# Patient Record
Sex: FEMALE | Race: BLACK OR AFRICAN AMERICAN | Employment: PART TIME | URBAN - METROPOLITAN AREA
[De-identification: names, ages, dates, MRNs, and addresses within clinical notes are randomized per-mention and may not be internally consistent; named-entity substitution may affect disease eponyms.]

---

## 2017-08-21 ENCOUNTER — OFFICE VISIT (OUTPATIENT)
Dept: FAMILY MEDICINE CLINIC | Age: 32
End: 2017-08-21

## 2017-08-21 DIAGNOSIS — N94.6 SEVERE MENSTRUAL CRAMPS: ICD-10-CM

## 2017-08-21 DIAGNOSIS — N92.1 MENORRHAGIA WITH IRREGULAR CYCLE: ICD-10-CM

## 2017-08-21 DIAGNOSIS — G43.009 MIGRAINE WITHOUT AURA AND WITHOUT STATUS MIGRAINOSUS, NOT INTRACTABLE: ICD-10-CM

## 2017-08-21 DIAGNOSIS — Z12.4 SCREENING FOR CERVICAL CANCER: ICD-10-CM

## 2017-08-21 DIAGNOSIS — Z13.220 SCREENING FOR CHOLESTEROL LEVEL: ICD-10-CM

## 2017-08-21 DIAGNOSIS — E66.01 MORBID OBESITY, UNSPECIFIED OBESITY TYPE (HCC): Primary | ICD-10-CM

## 2017-08-21 DIAGNOSIS — J45.20 MILD INTERMITTENT ASTHMA WITHOUT COMPLICATION: ICD-10-CM

## 2017-08-21 RX ORDER — PHENTERMINE HYDROCHLORIDE 37.5 MG/1
37.5 TABLET ORAL
Qty: 30 TAB | Refills: 3 | Status: SHIPPED | OUTPATIENT
Start: 2017-08-21 | End: 2018-02-27

## 2017-08-21 NOTE — PATIENT INSTRUCTIONS

## 2017-08-21 NOTE — MR AVS SNAPSHOT
Visit Information Date & Time Provider Department Dept. Phone Encounter #  
 8/21/2017 11:30 AM Dipika Rodríguez MD [facility] [id number] Follow-up Instructions Return in about 3 months (around 11/21/2017) for follow up. Upcoming Health Maintenance Date Due Pneumococcal 19-64 Medium Risk (1 of 1 - PPSV23) 9/10/2006 PAP AKA CERVICAL CYTOLOGY 5/13/2017 INFLUENZA AGE 9 TO ADULT 8/1/2017 DTaP/Tdap/Td series (2 - Td) 10/10/2019 Allergies as of 8/21/2017  Review Complete On: 8/21/2017 By: Dipika Rodríguez MD  
  
 Severity Noted Reaction Type Reactions Bactrim [Sulfamethoprim Ds] High 10/20/2015    Other (comments) Blisters-Abdirahman Gino's Iodine  08/10/2015   Side Effect Swelling, Hives Sulfamethoxazole-trimethoprim  09/14/2016    Unknown (comments) Pt states inside of her mouth feels raw when she takes Bactrim. Current Immunizations  Reviewed on 8/21/2017 Name Date Influenza Vaccine 11/11/2013 Influenza Vaccine Split 10/13/2011 Tdap 10/10/2009 Reviewed by Pop Gomez LPN on 3/16/6593 at 20:23 AM  
You Were Diagnosed With   
  
 Codes Comments Morbid obesity, unspecified obesity type ([facility]    -  Primary ICD-10-CM: E66.01 
ICD-9-CM: 278.01 Migraine without aura and without status migrainosus, not intractable     ICD-10-CM: Q00.546 ICD-9-CM: 346.10 Mild intermittent asthma without complication     [facility]-RN: J45.20 ICD-9-CM: 493.90 Screening for cervical cancer     ICD-10-CM: Z12.4 ICD-9-CM: V76.2 Screening for cholesterol level     ICD-10-CM: Z13.220 ICD-9-CM: V77.91 Menorrhagia with irregular cycle     ICD-10-CM: N92.1 ICD-9-CM: 626.2 Severe menstrual cramps     ICD-10-CM: N94.6 ICD-9-CM: 803. 3 Vitals BP Pulse Temp Resp Height(growth percentile) Weight(growth percentile)  143/89 78 97.2 °F (36.2 °C) (Oral) 17 5' 8\" (1.727 m) (!) 442 lb (200.5 kg) LMP BMI OB Status Smoking Status 07/16/2017 67.21 kg/m2 Having regular periods Never Smoker Vitals History BMI and BSA Data Body Mass Index Body Surface Area  
 67.21 kg/m 2 3.1 m 2 Preferred Pharmacy Pharmacy Name Phone [facility] [address], [place] [id number] [facility] [phone] Your Updated Medication List  
  
   
This list is accurate as of: 8/21/17 12:17 PM.  Always use your most recent med list.  
  
  
  
  
 HYDROcodone-acetaminophen 5-325 mg per tablet Commonly known as:  Fidel Kimmie Take 1 Tab by mouth every six (6) hours as needed for Pain. Max Daily Amount: 4 Tabs. ibuprofen 800 mg tablet Commonly known as:  MOTRIN Take 1 Tab by mouth every six (6) hours as needed for Pain.  
  
 phentermine 37.5 mg tablet Commonly known as:  ADIPEX-P Take 1 Tab by mouth every morning. Max Daily Amount: 37.5 mg. PROAIR HFA 90 mcg/actuation inhaler Generic drug:  albuterol Take 2 Puffs by inhalation every six (6) hours as needed for Wheezing. topiramate 50 mg tablet Commonly known as:  TOPAMAX Take 1 Tab by mouth two (2) times a day. Prescriptions Printed Refills  
 phentermine (ADIPEX-P) 37.5 mg tablet 3 Sig: Take 1 Tab by mouth every morning. Max Daily Amount: 37.5 mg.  
 Class: Print Route: Oral  
  
Follow-up Instructions Return in about 3 months (around 11/21/2017) for follow up. To-Do List   
 08/21/2017 Lab:  CBC WITH AUTOMATED DIFF   
  
 08/21/2017 Lab:  LIPID PANEL   
  
 08/21/2017 Lab:  METABOLIC PANEL, COMPREHENSIVE   
  
 08/21/2017 Pathology:  PAP, LB, RFX HPV [id number])   
  
 08/21/2017 Lab:  TSH 3RD GENERATION   
  
 09/21/2017 Imaging:  US PELV NON OBS  LTD Patient Instructions Well Visit, Ages 25 to 48: Care Instructions Your Care Instructions Physical exams can help you stay healthy.  Your doctor has checked your overall health and may have suggested ways to take good care of yourself. He or she also may have recommended tests. At home, you can help prevent illness with healthy eating, regular exercise, and other steps. Follow-up care is a key part of your treatment and safety. Be sure to make and go to all appointments, and call your doctor if you are having problems. It's also a good idea to know your test results and keep a list of the medicines you take. How can you care for yourself at home? · Reach and stay at a healthy weight. This will lower your risk for many problems, such as obesity, diabetes, heart disease, and high blood pressure. · Get at least 30 minutes of physical activity on most days of the week. Walking is a good choice. You also may want to do other activities, such as running, swimming, cycling, or playing tennis or team sports. Discuss any changes in your exercise program with your doctor. · Do not smoke or allow others to smoke around you. If you need help quitting, talk to your doctor about stop-smoking programs and medicines. These can increase your chances of quitting for good. · Talk to your doctor about whether you have any risk factors for sexually transmitted infections (STIs). Having one sex partner (who does not have STIs and does not have sex with anyone else) is a good way to avoid these infections. · Use birth control if you do not want to have children at this time. Talk with your doctor about the choices available and what might be best for you. · Protect your skin from too much sun. When you're outdoors from 10 a.m. to 4 p.m., stay in the shade or cover up with clothing and a hat with a wide brim. Wear sunglasses that block UV rays. Even when it's cloudy, put broad-spectrum sunscreen (SPF 30 or higher) on any exposed skin. · See a dentist one or two times a year for checkups and to have your teeth cleaned. · Wear a seat belt in the car. · Drink alcohol in moderation, if at all. That means no more than 2 drinks a day for men and 1 drink a day for women. Follow your doctor's advice about when to have certain tests. These tests can spot problems early. For everyone · Cholesterol. Have the fat (cholesterol) in your blood tested after age 21. Your doctor will tell you how often to have this done based on your age, family history, or other things that can increase your risk for heart disease. · Blood pressure. Have your blood pressure checked during a routine doctor visit. Your doctor will tell you how often to check your blood pressure based on your age, your blood pressure results, and other factors. · Vision. Talk with your doctor about how often to have a glaucoma test. 
· Diabetes. Ask your doctor whether you should have tests for diabetes. · Colon cancer. Have a test for colon cancer at age 48. You may have one of several tests. If you are younger than 48, you may need a test earlier if you have any risk factors. Risk factors include whether you already had a precancerous polyp removed from your colon or whether your parent, brother, sister, or child has had colon cancer. For women · Breast exam and mammogram. Talk to your doctor about when you should have a clinical breast exam and a mammogram. Medical experts differ on whether and how often women under 50 should have these tests. Your doctor can help you decide what is right for you. · Pap test and pelvic exam. Begin Pap tests at age 24. A Pap test is the best way to find cervical cancer. The test often is part of a pelvic exam. Ask how often to have this test. 
· Tests for sexually transmitted infections (STIs). Ask whether you should have tests for STIs. You may be at risk if you have sex with more than one person, especially if your partners do not wear condoms. For men · Tests for sexually transmitted infections (STIs).  Ask whether you should have tests for STIs. You may be at risk if you have sex with more than one person, especially if you do not wear a condom. · Testicular cancer exam. Ask your doctor whether you should check your testicles regularly. · Prostate exam. Talk to your doctor about whether you should have a blood test (called a PSA test) for prostate cancer. Experts differ on whether and when men should have this test. Some experts suggest it if you are older than 39 and are -American or have a father or brother who got prostate cancer when he was younger than 72. When should you call for help? Watch closely for changes in your health, and be sure to contact your doctor if you have any problems or symptoms that concern you. Where can you learn more? Go to http://doug-luna.info/. Enter P072 in the search box to learn more about \"Well Visit, Ages 25 to 48: Care Instructions. \" Current as of: July 19, 2016 Content Version: 11.3 © 6387-0707 [employer]. Care instructions adapted under license by [employer] (which disclaims liability or warranty for this information). If you have questions about a medical condition or this instruction, always ask your healthcare professional. [place] [address] any warranty or liability for your use of this information. Introducing [facility] [place] [facility]! Dear Elyse Green: 
Thank you for requesting a [employer] account. Our records indicate that you already have an active [employer] account. You can access your account anytime at [email or web address]. [email or web address] Did you know that you can access your hospital and ER discharge instructions at any time in [employer]? You can also review all of your test results from your hospital stay or ER visit. Additional Information If you have questions, please visit the Frequently Asked Questions section of the [employer] website at [email or web address] Remember, [employer] is NOT to be used for urgent needs. For medical emergencies, dial 911. Now available from your iPhone and Android! Please provide this summary of care documentation to your next provider. Your primary care clinician is listed as [employer]. If you have any questions after today's visit, please call [phone].

## 2017-08-21 NOTE — PROGRESS NOTES
Chief Complaint   Patient presents with    Physical     *Pap    Other     Heavy Menstration       Pt is a 36y.o. year old female who presents for follow up of her chronic medical problems/PAP smear    Health Maintenance Due   Topic Date Due    Pneumococcal 19-64 Medium Risk (1 of 1 - PPSV23) 09/10/2006    PAP AKA CERVICAL CYTOLOGY  05/13/2017    INFLUENZA AGE 9 TO ADULT  08/01/2017       Wt Readings from Last 3 Encounters:   08/21/17 (!) 442 lb (200.5 kg)   02/13/17 (!) 409 lb (185.5 kg)   09/14/16 (!) 443 lb (200.9 kg)   Admits she has not been compliant with diet and exercise  Afraid of having bariatric surgery  Has never tried weight loss meds but interested in doing so    Lab Results   Component Value Date/Time    Cholesterol, total 116 09/14/2016 11:38 AM    HDL Cholesterol 35 09/14/2016 11:38 AM    LDL, calculated 62 09/14/2016 11:38 AM    VLDL, calculated 19 09/14/2016 11:38 AM    Triglyceride 96 09/14/2016 11:38 AM   Fasting today    Asthma-no recent attacks    Migraines-rare    New problem: severe menstrual cramps and heavy menses recently      ROS:    Pt denies: Wt loss, Fever/Chills, HA, Visual changes, Fatigue, Chest pain, SOB, COYLE, Abd pain, N/V/D/C, Blood in stool or urine, Edema. Pertinent positive as above in HPI. All others were negative    Patient Active Problem List   Diagnosis Code    Morbid obesity ([facility] E66.01    Migraine without aura and without status migrainosus, not intractable G43.009    Asthma J45.909       Past Medical History:   Diagnosis Date    Abscess and cellulitis     recurrent since age 17    Abscess and cellulitis     Asthma     since she was a child    Headache, common migraine        Current Outpatient Prescriptions   Medication Sig Dispense Refill           ibuprofen (MOTRIN) 800 mg tablet Take 1 Tab by mouth every six (6) hours as needed for Pain.  20 Tab 0    PROAIR HFA 90 mcg/actuation inhaler Take 2 Puffs by inhalation every six (6) hours as needed for Wheezing. 1 Inhaler 3    topiramate (TOPAMAX) 50 mg tablet Take 1 Tab by mouth two (2) times a day. 61 Tab 6       History   Smoking Status    Never Smoker   Smokeless Tobacco    Never Used       Allergies   Allergen Reactions    Bactrim [Sulfamethoprim Ds] Other (comments)     Blisters-Abdirahman Christian's    Iodine Swelling and Hives    Sulfamethoxazole-Trimethoprim Unknown (comments)     Pt states inside of her mouth feels raw when she takes Bactrim. Patient Labs were reviewed: yes      Patient Past Records were reviewed:  yes        Objective:     Vitals:    08/21/17 1053 08/21/17 1130   BP: 143/89 136/72   Pulse: 78    Resp: 17    Temp: 97.2 °F (36.2 °C)    TempSrc: Oral    Weight: (!) 442 lb (200.5 kg)    Height: 5' 8\" (1.727 m)      Body mass index is 67.21 kg/(m^2). Exam:   Appearance: alert, well appearing,  oriented to person, place, and time, acyanotic, in no respiratory distress and well hydrated. HEENT:  NC/AT, pink conj, anicteric sclerae  Neck:  No cervical lymphadenopathy, no JVD, no thyromegaly, no carotid bruit  Heart:  RRR without M/R/G  Lungs:  CTAB, no rhonchi, rales, or wheezes with good air exchange   Abdomen:  Non-tender, pos bowel sounds, no hepatosplenomegaly  Ext:  No C/C/E    Skin: no rash  Neuro: no lateralizing signs, CNs II-XII intact  Breast exam: no palpable masses bilaterally, no nipple discharge, no axillary adenopathy, no skin changes  Pelvic Exam: NEG, cervix visualized without any abnormalities, no AMT; PAP done    Assessment/ Plan:   Diagnoses and all orders for this visit:    1. Morbid obesity, unspecified obesity type (HCC)-discussed calorie counting and regular exercise to lose weight; Phentermine for a few months-advised re possible side effects and that this is not a long term tx  -     TSH 3RD GENERATION; Future  -     LIPID PANEL; Future  -     METABOLIC PANEL, COMPREHENSIVE; Future  -     phentermine (ADIPEX-P) 37.5 mg tablet;  Take 1 Tab by mouth every morning. Max Daily Amount: 37.5 mg.     2. Migraine without aura and without status migrainosus, not intractable-doing well on Topamax prophylaxis    3. Mild intermittent asthma without complication-asymptomatic, rarely takes the Albuterol inhaler    4. Screening for cervical cancer  -     PAP, LB, RFX HPV DAY([id number]); Future    5. Screening for cholesterol level-lipid panel done today    6. Menorrhagia with irregular cycle-advised this could also be likely from the recent weight gain  -     CBC WITH AUTOMATED DIFF; Future  -     US PELV NON OBS  LTD; Future    7. Severe menstrual cramps-rule out fibroids  -     US PELV NON OBS  LTD; Future      Follow-up Disposition:  Return in about 3 months (around 11/21/2017) for follow up. I have discussed the diagnosis with the patient and the intended plan as seen in the above orders. The patient has received an After-Visit Summary and questions were answered concerning future plans. Medication Side Effects and Warnings were discussed with patient: yes    Patient verbalized understanding of above instructions.     Zach Malik MD  Internal Medicine  [facility]

## 2017-08-21 NOTE — PROGRESS NOTES
1. Have you been to the ER, urgent care clinic since your last visit? Hospitalized since your last visit? No    2. Have you seen or consulted any other health care providers outside of the [address] [place] [address] since your last visit? Include any pap smears or colon screening. No     Patient presents for physical. Patient is due for a pap.

## 2017-08-22 LAB
ALBUMIN SERPL-MCNC: 4 G/DL (ref 3.5–5.5)
ALBUMIN/GLOB SERPL: 1.1 {RATIO} (ref 1.2–2.2)
ALP SERPL-CCNC: 97 IU/L (ref 39–117)
ALT SERPL-CCNC: 15 IU/L (ref 0–32)
AST SERPL-CCNC: 12 IU/L (ref 0–40)
BASOPHILS # BLD AUTO: 0 X10E3/UL (ref 0–0.2)
BASOPHILS NFR BLD AUTO: 0 %
BILIRUB SERPL-MCNC: <0.2 MG/DL (ref 0–1.2)
BUN SERPL-MCNC: 8 MG/DL (ref 6–20)
BUN/CREAT SERPL: 10 (ref 9–23)
CALCIUM SERPL-MCNC: 9.1 MG/DL (ref 8.7–10.2)
CHLORIDE SERPL-SCNC: 100 MMOL/L (ref 96–106)
CHOLEST SERPL-MCNC: 134 MG/DL (ref 100–199)
CO2 SERPL-SCNC: 24 MMOL/L (ref 18–29)
CREAT SERPL-MCNC: 0.78 MG/DL (ref 0.57–1)
EOSINOPHIL # BLD AUTO: 0.2 X10E3/UL (ref 0–0.4)
EOSINOPHIL NFR BLD AUTO: 2 %
ERYTHROCYTE [DISTWIDTH] IN BLOOD BY AUTOMATED COUNT: 15.1 % (ref 12.3–15.4)
GLOBULIN SER CALC-MCNC: 3.6 G/DL (ref 1.5–4.5)
GLUCOSE SERPL-MCNC: 92 MG/DL (ref 65–99)
HCT VFR BLD AUTO: 36.4 % (ref 34–46.6)
HDLC SERPL-MCNC: 32 MG/DL
HGB BLD-MCNC: 11.4 G/DL (ref 11.1–15.9)
IMM GRANULOCYTES # BLD: 0 X10E3/UL (ref 0–0.1)
IMM GRANULOCYTES NFR BLD: 0 %
INTERPRETATION, 910389: NORMAL
LDLC SERPL CALC-MCNC: 84 MG/DL (ref 0–99)
LYMPHOCYTES # BLD AUTO: 3.2 X10E3/UL (ref 0.7–3.1)
LYMPHOCYTES NFR BLD AUTO: 26 %
MCH RBC QN AUTO: 25.8 PG (ref 26.6–33)
MCHC RBC AUTO-ENTMCNC: 31.3 G/DL (ref 31.5–35.7)
MCV RBC AUTO: 82 FL (ref 79–97)
MONOCYTES # BLD AUTO: 0.4 X10E3/UL (ref 0.1–0.9)
MONOCYTES NFR BLD AUTO: 4 %
NEUTROPHILS # BLD AUTO: 8.5 X10E3/UL (ref 1.4–7)
NEUTROPHILS NFR BLD AUTO: 68 %
PLATELET # BLD AUTO: 440 X10E3/UL (ref 150–379)
POTASSIUM SERPL-SCNC: 4.7 MMOL/L (ref 3.5–5.2)
PROT SERPL-MCNC: 7.6 G/DL (ref 6–8.5)
RBC # BLD AUTO: 4.42 X10E6/UL (ref 3.77–5.28)
SODIUM SERPL-SCNC: 141 MMOL/L (ref 134–144)
TRIGL SERPL-MCNC: 92 MG/DL (ref 0–149)
TSH SERPL DL<=0.005 MIU/L-ACNC: 2.61 UIU/ML (ref 0.45–4.5)
VLDLC SERPL CALC-MCNC: 18 MG/DL (ref 5–40)
WBC # BLD AUTO: 12.4 X10E3/UL (ref 3.4–10.8)

## 2017-08-23 LAB
CYTOLOGIST CVX/VAG CYTO: NORMAL
DX ICD CODE: NORMAL
LABCORP, 190119: NORMAL
Lab: NORMAL
OTHER STN SPEC: NORMAL
PATH REPORT.FINAL DX SPEC: NORMAL
STAT OF ADQ CVX/VAG CYTO-IMP: NORMAL

## 2017-08-26 VITALS
HEIGHT: 68 IN | WEIGHT: 293 LBS | TEMPERATURE: 97.2 F | DIASTOLIC BLOOD PRESSURE: 72 MMHG | HEART RATE: 78 BPM | BODY MASS INDEX: 44.41 KG/M2 | SYSTOLIC BLOOD PRESSURE: 136 MMHG | RESPIRATION RATE: 17 BRPM

## 2018-02-27 ENCOUNTER — OFFICE VISIT (OUTPATIENT)
Dept: FAMILY MEDICINE CLINIC | Age: 33
End: 2018-02-27

## 2018-02-27 VITALS
WEIGHT: 293 LBS | OXYGEN SATURATION: 99 % | SYSTOLIC BLOOD PRESSURE: 147 MMHG | HEIGHT: 68 IN | HEART RATE: 81 BPM | TEMPERATURE: 97.5 F | RESPIRATION RATE: 20 BRPM | BODY MASS INDEX: 44.41 KG/M2 | DIASTOLIC BLOOD PRESSURE: 83 MMHG

## 2018-02-27 DIAGNOSIS — R03.0 ELEVATED BP WITHOUT DIAGNOSIS OF HYPERTENSION: ICD-10-CM

## 2018-02-27 DIAGNOSIS — G43.009 MIGRAINE WITHOUT AURA AND WITHOUT STATUS MIGRAINOSUS, NOT INTRACTABLE: ICD-10-CM

## 2018-02-27 DIAGNOSIS — Z30.2 ENCOUNTER FOR STERILIZATION: ICD-10-CM

## 2018-02-27 DIAGNOSIS — J45.20 MILD INTERMITTENT ASTHMA WITHOUT COMPLICATION: ICD-10-CM

## 2018-02-27 DIAGNOSIS — N94.6 MENSTRUAL CRAMPS: ICD-10-CM

## 2018-02-27 DIAGNOSIS — E66.01 MORBID OBESITY (HCC): Primary | ICD-10-CM

## 2018-02-27 RX ORDER — ALBUTEROL SULFATE 90 UG/1
2 AEROSOL, METERED RESPIRATORY (INHALATION)
Qty: 1 INHALER | Refills: 3 | Status: SHIPPED | OUTPATIENT
Start: 2018-02-27 | End: 2018-02-27 | Stop reason: SDUPTHER

## 2018-02-27 RX ORDER — ALBUTEROL SULFATE 90 UG/1
2 AEROSOL, METERED RESPIRATORY (INHALATION)
Qty: 1 INHALER | Refills: 3 | Status: SHIPPED | OUTPATIENT
Start: 2018-02-27 | End: 2019-04-02 | Stop reason: SDUPTHER

## 2018-02-27 RX ORDER — IBUPROFEN 800 MG/1
800 TABLET ORAL
Qty: 60 TAB | Refills: 3 | Status: SHIPPED | OUTPATIENT
Start: 2018-02-27 | End: 2020-10-12 | Stop reason: SDUPTHER

## 2018-02-27 NOTE — MR AVS SNAPSHOT
Flavia Mackey [facility] [address] [facility] [address] [facility] 83 [id number] 
[phone] Patient: Hilda Spivey MRN: [id number] CSS:[birth year] Visit Information Date & Time Provider Department Dept. Phone Encounter #  
 2/27/2018 11:15 AM Nikkie Desai MD [facility] [id number] Follow-up Instructions Return if symptoms worsen or fail to improve. Upcoming Health Maintenance Date Due DTaP/Tdap/Td series (2 - Td) 10/10/2019 PAP AKA CERVICAL CYTOLOGY 8/21/2020 Allergies as of 2/27/2018  Review Complete On: 2/27/2018 By: Nikkie Desai MD  
  
 Severity Noted Reaction Type Reactions Bactrim [Sulfamethoprim Ds] High 10/20/2015    Other (comments) Blisters-Abdirahman Gino's Iodine  08/10/2015   Side Effect Swelling, Hives Sulfamethoxazole-trimethoprim  09/14/2016    Unknown (comments) Pt states inside of her mouth feels raw when she takes Bactrim. Current Immunizations  Reviewed on 8/21/2017 Name Date Influenza Vaccine 11/11/2013 Influenza Vaccine Split 10/13/2011 Tdap 10/10/2009 Not reviewed this visit You Were Diagnosed With   
  
 Codes Comments Morbid obesity ([facility]    -  Primary ICD-10-CM: E66.01 
ICD-9-CM: 278.01 Mild intermittent asthma without complication     YMU-44-ND: J45.20 ICD-9-CM: 493.90 Menstrual cramps     ICD-10-CM: N94.6 ICD-9-CM: 625.3 Encounter for sterilization     ICD-10-CM: Z30.2 ICD-9-CM: V25.2 Vitals BP Pulse Temp Resp Height(growth percentile) Weight(growth percentile) 147/83 81 97.5 °F (36.4 °C) (Oral) 20 5' 8\" (1.727 m) (!) 449 lb (203.7 kg) LMP SpO2 BMI OB Status Smoking Status 02/22/2018 99% 68.27 kg/m2 Having regular periods Never Smoker Vitals History BMI and BSA Data Body Mass Index Body Surface Area  
 68.27 kg/m 2 3.13 m 2 Preferred Pharmacy Pharmacy Name Phone [facility], [place] - [address] [place] [address] [phone] Your Updated Medication List  
  
   
This list is accurate as of 2/27/18 12:24 PM.  Always use your most recent med list.  
  
  
  
  
 ibuprofen 800 mg tablet Commonly known as:  MOTRIN Take 1 Tab by mouth every eight (8) hours as needed for Pain. PROAIR HFA 90 mcg/actuation inhaler Generic drug:  albuterol Take 2 Puffs by inhalation every six (6) hours as needed for Wheezing. Prescriptions Sent to Pharmacy Refills PROAIR HFA 90 mcg/actuation inhaler 3 Sig: Take 2 Puffs by inhalation every six (6) hours as needed for Wheezing. Class: Normal  
 Pharmacy: [facility], [address] Ph #: [phone] Route: Inhalation  
 ibuprofen (MOTRIN) 800 mg tablet 3 Sig: Take 1 Tab by mouth every eight (8) hours as needed for Pain. Class: Normal  
 Pharmacy: [facility], [address] Ph #: [phone] Route: Oral  
  
We Performed the Following REFERRAL TO GYNECOLOGY [REF30 Custom] Comments:  
 Patient desires tubal ligation Follow-up Instructions Return if symptoms worsen or fail to improve. Referral Information Referral ID Referred By Referred To  
  
 [id number] Vicenta Alberts Not Available Visits Status Start Date End Date 1 New Request 2/27/18 2/27/19 If your referral has a status of pending review or denied, additional information will be sent to support the outcome of this decision. Patient Instructions Tubal Ligation: Before Your Surgery What is tubal ligation? Tubal ligation is surgery to close a woman's fallopian tubes. It's also called having your tubes tied. To close your tubes, the doctor may band, burn (cauterize), tie and cut, or clip them. The doctor may also completely remove the fallopian tubes. After this, an egg can't move down your tubes and can't be fertilized. This means you can't get pregnant. This surgery can be done in two ways. In laparoscopic surgery, a doctor puts a lighted tube (scope) and other tools through a few small cuts. These cuts are called incisions. One is just below your belly button. The other is lower on your abdomen. After this surgery, you will probably stay in the hospital for 2 to 4 hours. Most women can go back to work in 2 to 7 days. The other type of surgery is called open surgery. In this surgery, the doctor makes a larger incision above your pubic hairline or below your belly button. You will probably stay in the hospital for 1 to 3 days if you have this surgery. Most women can return to work in about 1 to 2 weeks. Tubal ligation can be done right after a woman delivers a baby. Open surgery is usually used. After the surgery, you should not be able to get pregnant. While there is a very small chance you could get pregnant, tubal ligation is a very reliable form of birth control. Tubal ligation won't affect your menstrual cycle or when you start menopause. It also won't affect your desire for sex. But you could feel more relaxed about having sex. This is because you don't have to worry about getting pregnant. Follow-up care is a key part of your treatment and safety. Be sure to make and go to all appointments, and call your doctor if you are having problems. It's also a good idea to know your test results and keep a list of the medicines you take. What happens before surgery? ?Surgery can be stressful. This information will help you understand what you can expect. And it will help you safely prepare for surgery. ? Preparing for surgery ? · Understand exactly what surgery is planned, along with the risks, benefits, and other options.   
 · Tell your doctors ALL the medicines, vitamins, supplements, and herbal remedies you take. Some of these can increase the risk of bleeding or interact with anesthesia. ? · If you take blood thinners, such as warfarin (Coumadin), clopidogrel (Plavix), or aspirin, be sure to talk to your doctor. He or she will tell you if you should stop taking these medicines before your surgery. Make sure that you understand exactly what your doctor wants you to do.  
? · Your doctor will tell you which medicines to take or stop before your surgery. You may need to stop taking certain medicines a week or more before surgery. So talk to your doctor as soon as you can.  
? · If you have an advance directive, let your doctor know. It may include a living will and a durable power of  for health care. Bring a copy to the hospital. If you don't have one, you may want to prepare one. It lets your doctor and loved ones know your health care wishes. Doctors advise that everyone prepare these papers before any type of surgery or procedure. ? · You may need to take a laxative or enema before surgery. Your doctor will tell you how to do this. What happens on the day of surgery? · Follow the instructions exactly about when to stop eating and drinking. If you don't, your surgery may be canceled. If your doctor told you to take your medicines on the day of surgery, take them with only a sip of water. ? · Take a bath or shower before you come in for your surgery. Do not apply lotions, perfumes, deodorants, or nail polish. ? · Do not shave the surgical site yourself. ? · Take off all jewelry and piercings. And take out contact lenses, if you wear them. ? At the hospital or surgery center · Bring a picture ID. ? · The area for surgery is often marked to make sure there are no errors. ? · You will be kept comfortable and safe by your anesthesia provider. You will be asleep during the surgery. ? · The surgery will take about 20 to 30 minutes. Going home · Be sure you have someone to drive you home. Anesthesia and pain medicine make it unsafe for you to drive. ? · You will be given more specific instructions about recovering from your surgery. They will cover things like diet, wound care, follow-up care, driving, and getting back to your normal routine. When should you call your doctor? · You have questions or concerns. ? · You don't understand how to prepare for your surgery. ? · You become ill before the surgery (such as fever, flu, or a cold). ? · You need to reschedule or have changed your mind about having the surgery. Where can you learn more? Go to http://doug-luna.info/. Enter I009 in the search box to learn more about \"Tubal Ligation: Before Your Surgery. \" Current as of: March 16, 2017 Content Version: 11.4 © 9888-0680 [facility]. Care instructions adapted under license by [employer] (which disclaims liability or warranty for this information). If you have questions about a medical condition or this instruction, always ask your healthcare professional. [place] [address] any warranty or liability for your use of this information. Introducing [facility]! Dear Suzanne Rosenberg: 
Thank you for requesting a [employer] account. Our records indicate that you already have an active [employer] account. You can access your account anytime at [email or web address] Did you know that you can access your hospital and ER discharge instructions at any time in [employer]? You can also review all of your test results from your hospital stay or ER visit. Additional Information If you have questions, please visit the Frequently Asked Questions section of the [employer] website at [email or web address] Remember, [employer] is NOT to be used for urgent needs. For medical emergencies, dial 911. Now available from your iPhone and Android! Please provide this summary of care documentation to your next provider. Your primary care clinician is listed as Gretel Harris. If you have any questions after today's visit, please call [phone].

## 2018-02-27 NOTE — PATIENT INSTRUCTIONS
Tubal Ligation: Before Your Surgery  What is tubal ligation? Tubal ligation is surgery to close a woman's fallopian tubes. It's also called having your tubes tied. To close your tubes, the doctor may band, burn (cauterize), tie and cut, or clip them. The doctor may also completely remove the fallopian tubes. After this, an egg can't move down your tubes and can't be fertilized. This means you can't get pregnant. This surgery can be done in two ways. In laparoscopic surgery, a doctor puts a lighted tube (scope) and other tools through a few small cuts. These cuts are called incisions. One is just below your belly button. The other is lower on your abdomen. After this surgery, you will probably stay in the hospital for 2 to 4 hours. Most women can go back to work in 2 to 7 days. The other type of surgery is called open surgery. In this surgery, the doctor makes a larger incision above your pubic hairline or below your belly button. You will probably stay in the hospital for 1 to 3 days if you have this surgery. Most women can return to work in about 1 to 2 weeks. Tubal ligation can be done right after a woman delivers a baby. Open surgery is usually used. After the surgery, you should not be able to get pregnant. While there is a very small chance you could get pregnant, tubal ligation is a very reliable form of birth control. Tubal ligation won't affect your menstrual cycle or when you start menopause. It also won't affect your desire for sex. But you could feel more relaxed about having sex. This is because you don't have to worry about getting pregnant. Follow-up care is a key part of your treatment and safety. Be sure to make and go to all appointments, and call your doctor if you are having problems. It's also a good idea to know your test results and keep a list of the medicines you take. What happens before surgery? ?Surgery can be stressful.  This information will help you understand what you can expect. And it will help you safely prepare for surgery. ? Preparing for surgery  ? · Understand exactly what surgery is planned, along with the risks, benefits, and other options. · Tell your doctors ALL the medicines, vitamins, supplements, and herbal remedies you take. Some of these can increase the risk of bleeding or interact with anesthesia. ? · If you take blood thinners, such as warfarin (Coumadin), clopidogrel (Plavix), or aspirin, be sure to talk to your doctor. He or she will tell you if you should stop taking these medicines before your surgery. Make sure that you understand exactly what your doctor wants you to do.   ? · Your doctor will tell you which medicines to take or stop before your surgery. You may need to stop taking certain medicines a week or more before surgery. So talk to your doctor as soon as you can.   ? · If you have an advance directive, let your doctor know. It may include a living will and a durable power of  for health care. Bring a copy to the hospital. If you don't have one, you may want to prepare one. It lets your doctor and loved ones know your health care wishes. Doctors advise that everyone prepare these papers before any type of surgery or procedure. ? · You may need to take a laxative or enema before surgery. Your doctor will tell you how to do this. What happens on the day of surgery? · Follow the instructions exactly about when to stop eating and drinking. If you don't, your surgery may be canceled. If your doctor told you to take your medicines on the day of surgery, take them with only a sip of water. ? · Take a bath or shower before you come in for your surgery. Do not apply lotions, perfumes, deodorants, or nail polish. ? · Do not shave the surgical site yourself. ? · Take off all jewelry and piercings. And take out contact lenses, if you wear them. ? At the hospital or surgery center   · Bring a picture ID. ?  · The area for surgery is often marked to make sure there are no errors. ? · You will be kept comfortable and safe by your anesthesia provider. You will be asleep during the surgery. ? · The surgery will take about 20 to 30 minutes. Going home   · Be sure you have someone to drive you home. Anesthesia and pain medicine make it unsafe for you to drive. ? · You will be given more specific instructions about recovering from your surgery. They will cover things like diet, wound care, follow-up care, driving, and getting back to your normal routine. When should you call your doctor? · You have questions or concerns. ? · You don't understand how to prepare for your surgery. ? · You become ill before the surgery (such as fever, flu, or a cold). ? · You need to reschedule or have changed your mind about having the surgery. Where can you learn more? Go to http://doug-luna.info/. Enter I009 in the search box to learn more about \"Tubal Ligation: Before Your Surgery. \"  Current as of: March 16, 2017  Content Version: 11.4  © 6945-0199 [employer]. Care instructions adapted under license by [employer] (which disclaims liability or warranty for this information). If you have questions about a medical condition or this instruction, always ask your healthcare professional. [address] any warranty or liability for your use of this information.

## 2018-02-27 NOTE — PROGRESS NOTES
Chief Complaint   Patient presents with    Other     Patient would like to discuss tubal ligation    Medication Refill     Motrin and Proair inhaler     1. Have you been to the ER, urgent care clinic since your last visit? Hospitalized since your last visit? No    2. Have you seen or consulted any other health care providers outside of the [address] [place] [address] since your last visit? Include any pap smears or colon screening.  No

## 2018-02-27 NOTE — PROGRESS NOTES
Chief Complaint   Patient presents with    Other     Patient would like to discuss tubal ligation    Medication Refill     Motrin and Proair inhaler       Pt is a 29y.o. year old female who presents for follow up of her chronic medical problems    There are no preventive care reminders to display for this patient. Asthma not bothering her    Wt Readings from Last 3 Encounters:   02/27/18 (!) 449 lb (203.7 kg)   08/21/17 (!) 442 lb (200.5 kg)   02/13/17 (!) 409 lb (185.5 kg)   lost and gained weight  Never took the Phentermine  Scared of bariatric surgery    Wants BTL  Bleeding all the time with Depo  Forgets to take the pills; had a lot of clots  2 kids, 7 and 8 and no longer wants a baby; fiancee agreeable to it    Topamax did not help ,migraines; just taking Excedrin which helps    ROS:    Pt denies: Wt loss, Fever/Chills, HA, Visual changes, Fatigue, Chest pain, SOB, COYLE, Abd pain, N/V/D/C, Blood in stool or urine, Edema. Pertinent positive as above in HPI. All others were negative    Patient Active Problem List   Diagnosis Code    Morbid obesity ([facility] E66.01    Migraine without aura and without status migrainosus, not intractable G43.009    Asthma J45.909       Past Medical History:   Diagnosis Date    Abscess and cellulitis     recurrent since age 17    Abscess and cellulitis     Asthma     since she was a child    Headache, common migraine        Current Outpatient Prescriptions   Medication Sig Dispense Refill    PROAIR HFA 90 mcg/actuation inhaler Take 2 Puffs by inhalation every six (6) hours as needed for Wheezing. 1 Inhaler 3    ibuprofen (MOTRIN) 800 mg tablet Take 1 Tab by mouth every eight (8) hours as needed for Pain.  61 Tab 3       History   Smoking Status    Never Smoker   Smokeless Tobacco    Never Used       Allergies   Allergen Reactions    Bactrim [Sulfamethoprim Ds] Other (comments)     Blisters-Abdirahman Gino's    Iodine Swelling and Hives    Sulfamethoxazole-Trimethoprim Unknown (comments)     Pt states inside of her mouth feels raw when she takes Bactrim. Patient Labs were reviewed: yes      Patient Past Records were reviewed:  yes        Objective:     Vitals:    02/27/18 1139   BP: 147/83   Pulse: 81   Resp: 20   Temp: 97.5 °F (36.4 °C)   TempSrc: Oral   SpO2: 99%   Weight: (!) 449 lb (203.7 kg)   Height: 5' 8\" (1.727 m)     Body mass index is 68.27 kg/(m^2). Exam:   Appearance: alert, well appearing,  oriented to person, place, and time, acyanotic, in no respiratory distress and well hydrated. HEENT:  NC/AT, pink conj, anicteric sclerae  Neck:  No cervical lymphadenopathy, no JVD, no thyromegaly, no carotid bruit  Heart:  RRR without M/R/G  Lungs:  CTAB, no rhonchi, rales, or wheezes with good air exchange   Abdomen:  Non-tender, pos bowel sounds, no hepatosplenomegaly  Ext:  No C/C/E    Skin: no rash  Neuro: no lateralizing signs, CNs II-XII intact      Assessment/ Plan:   Diagnoses and all orders for this visit:    1. Morbid obesity (HCC)-discussed calorie counting and walking for exercise to help with weight loss    2. Mild intermittent asthma without complication-continue  with prn Proair; osmany take OTC antihistamines for symptomatic relief of congestion  -     PROAIR HFA 90 mcg/actuation inhaler; Take 2 Puffs by inhalation every six (6) hours as needed for Wheezing. 3. Menstrual cramps  -     ibuprofen (MOTRIN) 800 mg tablet; Take 1 Tab by mouth every eight (8) hours as needed for Pain. 4. Migraine without aura and without status migrainosus, not intractable-on prn Excedrin    5. Encounter for sterilization  -     REFERRAL TO GYNECOLOGY for desire to have a BTL    6. BP slightly elevated today-advised low sodium diet/recheck next visit  BP Readings from Last 3 Encounters:   02/27/18 147/83   08/21/17 136/72   02/14/17 (!) 152/91       Follow-up Disposition:  Return if symptoms worsen or fail to improve.         I have discussed the diagnosis with the patient and the intended plan as seen in the above orders. The patient has received an After-Visit Summary and questions were answered concerning future plans. Medication Side Effects and Warnings were discussed with patient: yes    Patient verbalized understanding of above instructions.     Hoa Jean MD  Internal Medicine  [facility]

## 2019-04-02 ENCOUNTER — OFFICE VISIT (OUTPATIENT)
Dept: FAMILY MEDICINE CLINIC | Age: 34
End: 2019-04-02

## 2019-04-02 DIAGNOSIS — Z20.2 EXPOSURE TO SEXUALLY TRANSMITTED DISEASE (STD): ICD-10-CM

## 2019-04-02 DIAGNOSIS — B37.31 YEAST VAGINITIS: ICD-10-CM

## 2019-04-02 DIAGNOSIS — E66.01 MORBID OBESITY (HCC): ICD-10-CM

## 2019-04-02 DIAGNOSIS — J45.20 MILD INTERMITTENT ASTHMA WITHOUT COMPLICATION: Primary | ICD-10-CM

## 2019-04-02 RX ORDER — ALBUTEROL SULFATE 90 UG/1
2 AEROSOL, METERED RESPIRATORY (INHALATION)
Qty: 1 INHALER | Refills: 3 | Status: SHIPPED | OUTPATIENT
Start: 2019-04-02 | End: 2019-12-04 | Stop reason: SDUPTHER

## 2019-04-02 RX ORDER — FLUCONAZOLE 150 MG/1
150 TABLET ORAL DAILY
Qty: 1 TAB | Refills: 1 | Status: SHIPPED | OUTPATIENT
Start: 2019-04-02 | End: 2019-04-03

## 2019-04-02 NOTE — PATIENT INSTRUCTIONS
Starting a Weight Loss Plan: Care Instructions Your Care Instructions If you are thinking about losing weight, it can be hard to know where to start. Your doctor can help you set up a weight loss plan that best meets your needs. You may want to take a class on nutrition or exercise, or join a weight loss support group. If you have questions about how to make changes to your eating or exercise habits, ask your doctor about seeing a registered dietitian or an exercise specialist. 
It can be a big challenge to lose weight. But you do not have to make huge changes at once. Make small changes, and stick with them. When those changes become habit, add a few more changes. If you do not think you are ready to make changes right now, try to pick a date in the future. Make an appointment to see your doctor to discuss whether the time is right for you to start a plan. Follow-up care is a key part of your treatment and safety. Be sure to make and go to all appointments, and call your doctor if you are having problems. It's also a good idea to know your test results and keep a list of the medicines you take. How can you care for yourself at home? · Set realistic goals. Many people expect to lose much more weight than is likely. A weight loss of 5% to 10% of your body weight may be enough to improve your health. · Get family and friends involved to provide support. Talk to them about why you are trying to lose weight, and ask them to help. They can help by participating in exercise and having meals with you, even if they may be eating something different. · Find what works best for you. If you do not have time or do not like to cook, a program that offers meal replacement bars or shakes may be better for you. Or if you like to prepare meals, finding a plan that includes daily menus and recipes may be best. 
· Ask your doctor about other health professionals who can help you achieve your weight loss goals. ? A dietitian can help you make healthy changes in your diet. ? An exercise specialist or  can help you develop a safe and effective exercise program. 
? A counselor or psychiatrist can help you cope with issues such as depression, anxiety, or family problems that can make it hard to focus on weight loss. · Consider joining a support group for people who are trying to lose weight. Your doctor can suggest groups in your area. Where can you learn more? Go to http://doug-luna.info/. Enter W397 in the search box to learn more about \"Starting a Weight Loss Plan: Care Instructions. \" Current as of: June 25, 2018 Content Version: 11.9 © 2205-1054 Contractually, [employer]. Care instructions adapted under license by [employer] (which disclaims liability or warranty for this information). If you have questions about a medical condition or this instruction, always ask your healthcare professional. [address] any warranty or liability for your use of this information.

## 2019-04-02 NOTE — PROGRESS NOTES
Chief Complaint Patient presents with  Exposure to STD Exposed to Chlamydia  Yeast Infection On antibiotics for ear infection  Follow Up Chronic Condition Pt is a 34y.o. year old female who presents for an acute visit for the above/follow up Wt Readings from Last 3 Encounters:  
04/02/19 (!) 499 lb 9.6 oz (226.6 kg) 02/27/18 (!) 449 lb (203.7 kg) 08/21/17 (!) 442 lb (200.5 kg)  
scared of  Bariatric surgery but agreed to go to the seminar Weight back and forth-cannot lose it for a significant amount of time Needs refill of med for asthma Doing well on just prn Albuterol [facility]-was given 10 day of antibiotics; finished last Friday; now with vaginal itching, no discharge Ex boyfriend just told her he had Chlamydia Patient currently with no sxs ROS: 
 
Pt denies: Wt loss, Fever/Chills, HA, Visual changes, Fatigue, Chest pain, SOB, COYLE, Abd pain, N/V/D/C, Blood in stool or urine, Edema. Pertinent positive as above in HPI. All others were negative Patient Active Problem List  
Diagnosis Code  Morbid obesity (HCC) E66.01  
 Migraine without aura and without status migrainosus, not intractable G43.009  Asthma J45.909 Past Medical History:  
Diagnosis Date  Abscess and cellulitis   
 recurrent since age 17  Abscess and cellulitis  Asthma   
 since she was a child  Headache, common migraine Current Outpatient Medications Medication Sig Dispense Refill  PROAIR HFA 90 mcg/actuation inhaler Take 2 Puffs by inhalation every six (6) hours as needed for Wheezing. 1 Inhaler 3  ibuprofen (MOTRIN) 800 mg tablet Take 1 Tab by mouth every eight (8) hours as needed for Pain. 60 Tab 3 Social History Tobacco Use Smoking Status Never Smoker Smokeless Tobacco Never Used Allergies Allergen Reactions  Bactrim [Sulfamethoprim Ds] Other (comments) Blisters-Abdirahman Cast  Iodine Swelling and Hives  Sulfamethoxazole-Trimethoprim Unknown (comments) Pt states inside of her mouth feels raw when she takes Bactrim. Patient Labs were reviewed: yes Patient Past Records were reviewed:  yes Objective:  
 
Vitals:  
 04/02/19 0949 Pulse: 100 Resp: 18 Temp: 97.5 °F (36.4 °C) TempSrc: Oral  
SpO2: 97% Weight: (!) 499 lb 9.6 oz (226.6 kg) Height: 5' 8\" (1.727 m) Body mass index is 75.96 kg/m². Exam:  
Appearance: alert, well appearing,  oriented to person, place, and time, acyanotic, in no respiratory distress and well hydrated. HEENT:  NC/AT, pink conj, anicteric sclerae Neck:  No cervical lymphadenopathy, no JVD, no thyromegaly, no carotid bruit Heart:  RRR without M/R/G Lungs:  CTAB, no rhonchi, rales, or wheezes with good air exchange Abdomen:  Non-tender, pos bowel sounds, no hepatosplenomegaly Ext:  No C/C/E Skin: no rash Neuro: no lateralizing signs, CNs II-XII intact Assessment/ Plan:  
Diagnoses and all orders for this visit: 
 
1. Mild intermittent asthma without complication-continue with prn Aalbuterol 
-     PROAIR HFA 90 mcg/actuation inhaler; Take 2 Puffs by inhalation every six (6) hours as needed for Wheezing. 
-     CBC WITH AUTOMATED DIFF; Future 2. Morbid obesity (HCC)-discussed limiting tamar to 5615-6431/day and exercising at least 150 min a week 
-     CBC WITH AUTOMATED DIFF; Future -     METABOLIC PANEL, COMPREHENSIVE; Future -     LIPID PANEL; Future 
-     TSH 3RD GENERATION; Future 
-     REFERRAL TO BARIATRIC SURGERY 3. Exposure to sexually transmitted disease (STD)-currently asymptomatic 
-     CHLAMYDIA/NEISSERIA AMPLIFICATION; Future -     CBC WITH AUTOMATED DIFF; Future 4. Yeast vaginitis-RTc next week if sxs persist after taking the Diflucan 
-     fluconazole (DIFLUCAN) 150 mg tablet; Take 1 Tab by mouth daily for 1 day. Follow-up and Dispositions · Return in about 6 months (around 10/2/2019) for follow up. I have discussed the diagnosis with the patient and the intended plan as seen in the above orders. The patient has received an After-Visit Summary and questions were answered concerning future plans. Medication Side Effects and Warnings were discussed with patient: yes Patient verbalized understanding of above instructions. Reji Gibson MD 
Internal Medicine [facility]

## 2019-04-02 NOTE — PROGRESS NOTES
Chief Complaint Patient presents with  Exposure to STD Exposed to Chlamydia  Yeast Infection On antibiotics for ear infection 1. Have you been to the ER, urgent care clinic since your last visit? Hospitalized since your last visit? Yes Where: [facility] for [facility] infection 2. Have you seen or consulted any other health care providers outside of the [address] [place] [address] since your last visit? Include any pap smears or colon screening.  No

## 2019-04-04 LAB
ALBUMIN SERPL-MCNC: 4.2 G/DL (ref 3.5–5.5)
ALBUMIN/GLOB SERPL: 1.2 {RATIO} (ref 1.2–2.2)
ALP SERPL-CCNC: 100 IU/L (ref 39–117)
ALT SERPL-CCNC: 15 IU/L (ref 0–32)
AST SERPL-CCNC: 12 IU/L (ref 0–40)
BASOPHILS # BLD AUTO: 0 X10E3/UL (ref 0–0.2)
BASOPHILS NFR BLD AUTO: 0 %
BILIRUB SERPL-MCNC: <0.2 MG/DL (ref 0–1.2)
BUN SERPL-MCNC: 12 MG/DL (ref 6–20)
BUN/CREAT SERPL: 13 (ref 9–23)
C TRACH RRNA SPEC QL NAA+PROBE: NEGATIVE
CALCIUM SERPL-MCNC: 9.3 MG/DL (ref 8.7–10.2)
CHLORIDE SERPL-SCNC: 100 MMOL/L (ref 96–106)
CHOLEST SERPL-MCNC: 128 MG/DL (ref 100–199)
CO2 SERPL-SCNC: 23 MMOL/L (ref 20–29)
CREAT SERPL-MCNC: 0.91 MG/DL (ref 0.57–1)
EOSINOPHIL # BLD AUTO: 0.2 X10E3/UL (ref 0–0.4)
EOSINOPHIL NFR BLD AUTO: 1 %
ERYTHROCYTE [DISTWIDTH] IN BLOOD BY AUTOMATED COUNT: 15.3 % (ref 12.3–15.4)
GLOBULIN SER CALC-MCNC: 3.6 G/DL (ref 1.5–4.5)
GLUCOSE SERPL-MCNC: 94 MG/DL (ref 65–99)
HCT VFR BLD AUTO: 36.7 % (ref 34–46.6)
HDLC SERPL-MCNC: 34 MG/DL
HGB BLD-MCNC: 11.2 G/DL (ref 11.1–15.9)
IMM GRANULOCYTES # BLD AUTO: 0 X10E3/UL (ref 0–0.1)
IMM GRANULOCYTES NFR BLD AUTO: 0 %
INTERPRETATION, 910389: NORMAL
LDLC SERPL CALC-MCNC: 77 MG/DL (ref 0–99)
LYMPHOCYTES # BLD AUTO: 3.8 X10E3/UL (ref 0.7–3.1)
LYMPHOCYTES NFR BLD AUTO: 29 %
MCH RBC QN AUTO: 25.5 PG (ref 26.6–33)
MCHC RBC AUTO-ENTMCNC: 30.5 G/DL (ref 31.5–35.7)
MCV RBC AUTO: 84 FL (ref 79–97)
MONOCYTES # BLD AUTO: 0.5 X10E3/UL (ref 0.1–0.9)
MONOCYTES NFR BLD AUTO: 4 %
N GONORRHOEA RRNA SPEC QL NAA+PROBE: NEGATIVE
NEUTROPHILS # BLD AUTO: 8.7 X10E3/UL (ref 1.4–7)
NEUTROPHILS NFR BLD AUTO: 66 %
PLATELET # BLD AUTO: 491 X10E3/UL (ref 150–379)
POTASSIUM SERPL-SCNC: 4.5 MMOL/L (ref 3.5–5.2)
PROT SERPL-MCNC: 7.8 G/DL (ref 6–8.5)
RBC # BLD AUTO: 4.39 X10E6/UL (ref 3.77–5.28)
SODIUM SERPL-SCNC: 140 MMOL/L (ref 134–144)
TRIGL SERPL-MCNC: 85 MG/DL (ref 0–149)
TSH SERPL DL<=0.005 MIU/L-ACNC: 3.54 UIU/ML (ref 0.45–4.5)
VLDLC SERPL CALC-MCNC: 17 MG/DL (ref 5–40)
WBC # BLD AUTO: 13.2 X10E3/UL (ref 3.4–10.8)

## 2019-04-05 VITALS
OXYGEN SATURATION: 97 % | HEART RATE: 100 BPM | SYSTOLIC BLOOD PRESSURE: 130 MMHG | HEIGHT: 68 IN | WEIGHT: 293 LBS | RESPIRATION RATE: 18 BRPM | BODY MASS INDEX: 44.41 KG/M2 | DIASTOLIC BLOOD PRESSURE: 80 MMHG | TEMPERATURE: 97.5 F

## 2019-12-04 ENCOUNTER — OFFICE VISIT (OUTPATIENT)
Dept: FAMILY MEDICINE CLINIC | Age: 34
End: 2019-12-04

## 2019-12-04 VITALS
BODY MASS INDEX: 44.41 KG/M2 | OXYGEN SATURATION: 98 % | HEIGHT: 68 IN | DIASTOLIC BLOOD PRESSURE: 88 MMHG | WEIGHT: 293 LBS | SYSTOLIC BLOOD PRESSURE: 150 MMHG | RESPIRATION RATE: 18 BRPM | HEART RATE: 87 BPM | TEMPERATURE: 96.5 F

## 2019-12-04 DIAGNOSIS — E66.01 MORBID OBESITY (HCC): ICD-10-CM

## 2019-12-04 DIAGNOSIS — B37.31 YEAST VAGINITIS: ICD-10-CM

## 2019-12-04 DIAGNOSIS — R30.0 DYSURIA: Primary | ICD-10-CM

## 2019-12-04 DIAGNOSIS — H69.83 DYSFUNCTION OF BOTH EUSTACHIAN TUBES: ICD-10-CM

## 2019-12-04 DIAGNOSIS — J45.20 MILD INTERMITTENT ASTHMA WITHOUT COMPLICATION: ICD-10-CM

## 2019-12-04 DIAGNOSIS — G43.009 MIGRAINE WITHOUT AURA AND WITHOUT STATUS MIGRAINOSUS, NOT INTRACTABLE: ICD-10-CM

## 2019-12-04 DIAGNOSIS — R03.0 ELEVATED BP WITHOUT DIAGNOSIS OF HYPERTENSION: ICD-10-CM

## 2019-12-04 LAB
BILIRUB UR QL STRIP: NEGATIVE
GLUCOSE UR-MCNC: NEGATIVE MG/DL
KETONES P FAST UR STRIP-MCNC: NEGATIVE MG/DL
PH UR STRIP: 6 [PH] (ref 4.6–8)
PROT UR QL STRIP: NORMAL
SP GR UR STRIP: 1.03 (ref 1–1.03)
UA UROBILINOGEN AMB POC: NORMAL (ref 0.2–1)
URINALYSIS CLARITY POC: NORMAL
URINALYSIS COLOR POC: YELLOW
URINE BLOOD POC: NORMAL
URINE LEUKOCYTES POC: NORMAL
URINE NITRITES POC: NEGATIVE

## 2019-12-04 RX ORDER — ALBUTEROL SULFATE 90 UG/1
2 AEROSOL, METERED RESPIRATORY (INHALATION)
Qty: 1 INHALER | Refills: 3 | Status: SHIPPED | OUTPATIENT
Start: 2019-12-04 | End: 2020-10-12 | Stop reason: SDUPTHER

## 2019-12-04 RX ORDER — NITROFURANTOIN 25; 75 MG/1; MG/1
100 CAPSULE ORAL 2 TIMES DAILY
Qty: 14 CAP | Refills: 0 | Status: SHIPPED | OUTPATIENT
Start: 2019-12-04 | End: 2019-12-11

## 2019-12-04 RX ORDER — FLUCONAZOLE 150 MG/1
150 TABLET ORAL DAILY
Qty: 1 TAB | Refills: 0 | Status: SHIPPED | OUTPATIENT
Start: 2019-12-04 | End: 2019-12-05

## 2019-12-04 NOTE — PATIENT INSTRUCTIONS
DASH Diet: Care Instructions Your Care Instructions The DASH diet is an eating plan that can help lower your blood pressure. DASH stands for Dietary Approaches to Stop Hypertension. Hypertension is high blood pressure. The DASH diet focuses on eating foods that are high in calcium, potassium, and magnesium. These nutrients can lower blood pressure. The foods that are highest in these nutrients are fruits, vegetables, low-fat dairy products, nuts, seeds, and legumes. But taking calcium, potassium, and magnesium supplements instead of eating foods that are high in those nutrients does not have the same effect. The DASH diet also includes whole grains, fish, and poultry. The DASH diet is one of several lifestyle changes your doctor may recommend to lower your high blood pressure. Your doctor may also want you to decrease the amount of sodium in your diet. Lowering sodium while following the DASH diet can lower blood pressure even further than just the DASH diet alone. Follow-up care is a key part of your treatment and safety. Be sure to make and go to all appointments, and call your doctor if you are having problems. It's also a good idea to know your test results and keep a list of the medicines you take. How can you care for yourself at home? Following the DASH diet · Eat 4 to 5 servings of fruit each day. A serving is 1 medium-sized piece of fruit, ½ cup chopped or canned fruit, 1/4 cup dried fruit, or 4 ounces (½ cup) of fruit juice. Choose fruit more often than fruit juice. · Eat 4 to 5 servings of vegetables each day. A serving is 1 cup of lettuce or raw leafy vegetables, ½ cup of chopped or cooked vegetables, or 4 ounces (½ cup) of vegetable juice. Choose vegetables more often than vegetable juice. · Get 2 to 3 servings of low-fat and fat-free dairy each day. A serving is 8 ounces of milk, 1 cup of yogurt, or 1 ½ ounces of cheese. · Eat 6 to 8 servings of grains each day. A serving is 1 slice of bread, 1 ounce of dry cereal, or ½ cup of cooked rice, pasta, or cooked cereal. Try to choose whole-grain products as much as possible. · Limit lean meat, poultry, and fish to 2 servings each day. A serving is 3 ounces, about the size of a deck of cards. · Eat 4 to 5 servings of nuts, seeds, and legumes (cooked dried beans, lentils, and split peas) each week. A serving is 1/3 cup of nuts, 2 tablespoons of seeds, or ½ cup of cooked beans or peas. · Limit fats and oils to 2 to 3 servings each day. A serving is 1 teaspoon of vegetable oil or 2 tablespoons of salad dressing. · Limit sweets and added sugars to 5 servings or less a week. A serving is 1 tablespoon jelly or jam, ½ cup sorbet, or 1 cup of lemonade. · Eat less than 2,300 milligrams (mg) of sodium a day. If you limit your sodium to 1,500 mg a day, you can lower your blood pressure even more. Tips for success · Start small. Do not try to make dramatic changes to your diet all at once. You might feel that you are missing out on your favorite foods and then be more likely to not follow the plan. Make small changes, and stick with them. Once those changes become habit, add a few more changes. · Try some of the following: ? Make it a goal to eat a fruit or vegetable at every meal and at snacks. This will make it easy to get the recommended amount of fruits and vegetables each day. ? Try yogurt topped with fruit and nuts for a snack or healthy dessert. ? Add lettuce, tomato, cucumber, and onion to sandwiches. ? Combine a ready-made pizza crust with low-fat mozzarella cheese and lots of vegetable toppings. Try using tomatoes, squash, spinach, broccoli, carrots, cauliflower, and onions. ? Have a variety of cut-up vegetables with a low-fat dip as an appetizer instead of chips and dip. ? Sprinkle sunflower seeds or chopped almonds over salads.  Or try adding chopped walnuts or almonds to cooked vegetables. ? Try some vegetarian meals using beans and peas. Add garbanzo or kidney beans to salads. Make burritos and tacos with mashed painter beans or black beans. Where can you learn more? Go to http://doug-luna.info/. Enter X637 in the search box to learn more about \"DASH Diet: Care Instructions. \" Current as of: April 9, 2019 Content Version: 12.2 © 1590-8075 [employer]. Care instructions adapted under license by [employer] (which disclaims liability or warranty for this information). If you have questions about a medical condition or this instruction, always ask your healthcare professional. [address] any warranty or liability for your use of this information.

## 2019-12-04 NOTE — PROGRESS NOTES
Beryl Tellez is a 30 y.o. female presents in office for    Chief Complaint   Patient presents with    Abdominal Pain     with urinary burning; x3-4 days    Ear Pain     left ear; x1 week        Visit Vitals  /88 (BP 1 Location: Left arm, BP Patient Position: Sitting)   Pulse 87   Temp 96.5 °F (35.8 °C) (Oral)   Resp 18   Ht 5' 8\" (1.727 m)   Wt (!) 510 lb (231.3 kg)   SpO2 98%   BMI 77.55 kg/m²         Health Maintenance Due   Topic Date Due    Pneumococcal 0-64 years (1 of 1 - PPSV23) 09/10/1993    MEDICARE YEARLY EXAM  02/09/2019    Influenza Age 9 to Adult  08/01/2019    DTaP/Tdap/Td series (2 - Td) 10/10/2019             1. Have you been to the ER, urgent care clinic since your last visit? Hospitalized since your last visit? No    2. Have you seen or consulted any other health care providers outside of the [address] [place] [address] since your last visit? Include any pap smears or colon screening. No    3 most recent PHQ Screens 4/2/2019   Little interest or pleasure in doing things Not at all   Feeling down, depressed, irritable, or hopeless Not at all   Total Score PHQ 2 0       Abuse Screening Questionnaire 12/4/2019   Do you ever feel afraid of your partner? N   Are you in a relationship with someone who physically or mentally threatens you? N   Is it safe for you to go home? Y       Fall Risk Assessment, last 12 mths 12/4/2019   Able to walk? Yes   Fall in past 12 months?  No       Learning Assessment 2/27/2014   PRIMARY LEARNER Patient   HIGHEST LEVEL OF EDUCATION - PRIMARY LEARNER  DID NOT GRADUATE HIGH SCHOOL   BARRIERS PRIMARY LEARNER NONE   CO-LEARNER CAREGIVER No   PRIMARY LANGUAGE ENGLISH   LEARNER PREFERENCE PRIMARY DEMONSTRATION   ANSWERED BY Ramiro Montanez   RELATIONSHIP SELF

## 2019-12-04 NOTE — PROGRESS NOTES
Chief Complaint   Patient presents with    Abdominal Pain     with urinary burning; x3-4 days    Ear Pain     left ear; x1 week       Pt is a 30y.o. year old female who presents for follow up of her chronic medical problems    Health Maintenance Due   Topic Date Due    Pneumococcal 0-64 years (1 of 1 - PPSV23) 09/10/1993    MEDICARE YEARLY EXAM -does not have Medicare 02/09/2019    Influenza Age 9 to Adult  08/01/2019    DTaP/Tdap/Td series (2 - Td) 10/10/2019       Wt Readings from Last 3 Encounters:   12/04/19 (!) 510 lb (231.3 kg)   04/02/19 (!) 499 lb 9.6 oz (226.6 kg)   02/27/18 (!) 449 lb (203.7 kg)    will consider bariatric surgery    Lab Results   Component Value Date/Time    TSH 3.540 04/02/2019 10:40 AM   BTL so no more pregnancies  Ectopic in 2013    Pain with urination for a few days   Urine dip showed 3+ lesly, 1+ blood (not on her menses)    BP at bit high today  BP Readings from Last 3 Encounters:   12/04/19 150/88   04/02/19 130/80   02/27/18 147/83       Needs refill of asthma pump-rarely uses it-only with weather change; had cold recently    Migraines-takes Motrin or Excedrin prn    ?ear infection on both, pain/draining afdter recent cold sxs  [facility] and had antibiotics for similar sxs in April    Asking for Diflucan bec always gets yeast with antibiotics    ROS:    Pt denies: Wt loss, Fever/Chills, HA, Visual changes, Fatigue, Chest pain, SOB, COYLE, Abd pain, N/V/D/C, Blood in stool or urine, Edema. Pertinent positive as above in HPI.  All others were negative    Patient Active Problem List   Diagnosis Code    Morbid obesity ([facility] [address] E66.01    Migraine without aura and without status migrainosus, not intractable G43.009    Asthma J45.909       Past Medical History:   Diagnosis Date    Abscess and cellulitis     recurrent since age 17    Abscess and cellulitis     Asthma     since she was a child    Headache, common migraine        Current Outpatient Medications   Medication Sig Dispense Refill    PROAIR HFA 90 mcg/actuation inhaler Take 2 Puffs by inhalation every six (6) hours as needed for Wheezing. 1 Inhaler 3                  ibuprofen (MOTRIN) 800 mg tablet Take 1 Tab by mouth every eight (8) hours as needed for Pain. 60 Tab 3       Social History     Tobacco Use   Smoking Status Never Smoker   Smokeless Tobacco Never Used       Allergies   Allergen Reactions    Bactrim [Sulfamethoprim Ds] Other (comments)     Blisters-Abdirahman Gino's    Iodine Swelling and Hives    Sulfamethoxazole-Trimethoprim Unknown (comments)     Pt states inside of her mouth feels raw when she takes Bactrim. Patient Labs were reviewed: yes      Patient Past Records were reviewed:  yes        Objective:     Vitals:    12/04/19 0929   BP: 150/88   Pulse: 87   Resp: 18   Temp: 96.5 °F (35.8 °C)   TempSrc: Oral   SpO2: 98%   Weight: (!) 510 lb (231.3 kg)   Height: 5' 8\" (1.727 m)     Body mass index is 77.55 kg/m². Exam:   Appearance: alert, well appearing,  oriented to person, place, and time, acyanotic, in no respiratory distress and well hydrated. HEENT:  NC/AT, pink conj, anicteric sclerae, both TMs dull but not red  Neck:  No cervical lymphadenopathy, no JVD, no thyromegaly, no carotid bruit  Heart:  RRR without M/R/G  Lungs:  CTAB, no rhonchi, rales, or wheezes with good air exchange   Abdomen:  Non-tender, pos bowel sounds, no hepatosplenomegaly, no CVA tenderness  Ext:  No C/C/E    Skin: no rash  Neuro: no lateralizing signs, CNs II-XII intact      Assessment/ Plan:   Diagnoses and all orders for this visit:    1. Dysuria-empiric tx with Macrobid  -     AMB POC URINALYSIS DIP, urine culture STICK AUTO W/O MICRO  -     nitrofurantoin, macrocrystal-monohydrate, (MACROBID) 100 mg capsule; Take 1 Cap by mouth two (2) times a day for 7 days.     2. Morbid obesity (HCC)-discussed limiting tamar to 3441-3494/day and exercising at least 150 min a week  -     REFERRAL TO BARIATRIC SURGERY    3. Mild intermittent asthma without complication  -     PROAIR HFA 90 mcg/actuation inhaler; Take 2 Puffs by inhalation every six (6) hours as needed for Wheezing. 4. Yeast vaginitis  -     fluconazole (DIFLUCAN) 150 mg tablet; Take 1 Tab by mouth daily for 1 day. 5. Elevated BP without diagnosis of hypertension-low sodium diet discussed    6. Migraine without aura and without status migrainosus, not intractable-on prn Exedrin or Motrin    7. Dysfunction of both eustachian tubes-reassured, discussed symptomatic relief        Follow-up and Dispositions    · Return in about 3 months (around 3/4/2020) for follow up.     to recheck BP        I have discussed the diagnosis with the patient and the intended plan as seen in the above orders. The patient has received an After-Visit Summary and questions were answered concerning future plans. Medication Side Effects and Warnings were discussed with patient: yes    Patient verbalized understanding of above instructions.     Germaine Morales MD  Internal Medicine  [facility]

## 2019-12-06 LAB — BACTERIA UR CULT: NORMAL

## 2020-10-12 ENCOUNTER — VIRTUAL VISIT (OUTPATIENT)
Dept: FAMILY MEDICINE CLINIC | Age: 35
End: 2020-10-12
Payer: MEDICAID

## 2020-10-12 DIAGNOSIS — Z11.3 SCREEN FOR STD (SEXUALLY TRANSMITTED DISEASE): ICD-10-CM

## 2020-10-12 DIAGNOSIS — Z83.3 FAMILY HISTORY OF DIABETES MELLITUS (DM): ICD-10-CM

## 2020-10-12 DIAGNOSIS — R03.0 ELEVATED BP WITHOUT DIAGNOSIS OF HYPERTENSION: ICD-10-CM

## 2020-10-12 DIAGNOSIS — J45.20 MILD INTERMITTENT ASTHMA WITHOUT COMPLICATION: Primary | ICD-10-CM

## 2020-10-12 DIAGNOSIS — E66.01 MORBID OBESITY (HCC): ICD-10-CM

## 2020-10-12 DIAGNOSIS — N94.6 MENSTRUAL CRAMPS: ICD-10-CM

## 2020-10-12 PROCEDURE — 99214 OFFICE O/P EST MOD 30 MIN: CPT | Performed by: INTERNAL MEDICINE

## 2020-10-12 PROCEDURE — G8427 DOCREV CUR MEDS BY ELIG CLIN: HCPCS | Performed by: INTERNAL MEDICINE

## 2020-10-12 PROCEDURE — G0444 DEPRESSION SCREEN ANNUAL: HCPCS | Performed by: INTERNAL MEDICINE

## 2020-10-12 PROCEDURE — G8510 SCR DEP NEG, NO PLAN REQD: HCPCS | Performed by: INTERNAL MEDICINE

## 2020-10-12 RX ORDER — IBUPROFEN 800 MG/1
800 TABLET ORAL
Qty: 60 TAB | Refills: 3 | Status: SHIPPED | OUTPATIENT
Start: 2020-10-12 | End: 2021-03-16 | Stop reason: SDUPTHER

## 2020-10-12 RX ORDER — ALBUTEROL SULFATE 90 UG/1
2 AEROSOL, METERED RESPIRATORY (INHALATION)
Qty: 1 INHALER | Refills: 3 | Status: SHIPPED | OUTPATIENT
Start: 2020-10-12 | End: 2021-06-03 | Stop reason: SDUPTHER

## 2020-10-12 NOTE — PROGRESS NOTES
Chief Complaint   Patient presents with    Annual Wellness Visit    Follow-up     3 month     1. Have you been to the ER, urgent care clinic since your last visit? Hospitalized since your last visit? No    2. Have you seen or consulted any other health care providers outside of the [address] [place] [address] since your last visit? Include any pap smears or colon screening.  No      Health Maintenance Due   Topic    Pneumococcal 0-64 years (1 of 1 - PPSV23)    Medicare Yearly Exam     DTaP/Tdap/Td series (2 - Td)    PAP AKA CERVICAL CYTOLOGY     Flu Vaccine (1)

## 2020-10-12 NOTE — Clinical Note
Why does this patient have Medicare wellness on care gap when she has Medicaid or am I not seeing that she also has Medicare

## 2020-10-12 NOTE — PROGRESS NOTES
Thom Soto is a 37 y.o. female who was seen by synchronous (real-time) audio-video technology on 10/12/2020 for Annual Wellness Visit and Follow-up (3 month)        Assessment & Plan:   Diagnoses and all orders for this visit:    1. Mild intermittent asthma without complication-continue with prn Albuterol inhaler  -     ProAir HFA 90 mcg/actuation inhaler; Take 2 Puffs by inhalation every six (6) hours as needed for Wheezing. 2. Elevated BP without diagnosis of hypertension-low sodium diet; advised home monitoring  -     CBC WITH AUTOMATED DIFF; Future  -     METABOLIC PANEL, COMPREHENSIVE; Future  -     LIPID PANEL; Future    3. Menstrual cramps  -     ibuprofen (MOTRIN) 800 mg tablet; Take 1 Tab by mouth every eight (8) hours as needed for Pain. 4. Morbid obesity (HCC)-discussed limiting tamar to 9402-7707/day and exercising at least 150 min a week  -     TSH 3RD GENERATION; Future  -     HEMOGLOBIN A1C WITH EAG; Future    5. Family history of diabetes mellitus (DM)-weight loss is imperative  -     HEMOGLOBIN A1C WITH EAG; Future    6. Screen for STD (sexually transmitted disease)-denies any sxs but wants to be sure she says  -     HIV 1/2 AG/AB, [address]; Future  -     CHLAMYDIA/NEISSERIA AMPLIFICATION; Future        Follow-up and Dispositions    · Return in about 6 months (around 4/12/2021) for follow up and PAP smear; nurse visit for BP chek  and fasting labs shortly.        712  Subjective:     Health Maintenance Due   Topic Date Due    Pneumococcal 0-64 years (1 of 1 - PPSV23) 09/10/1993    Medicare Yearly Exam -does not have Medicare 02/09/2019    DTaP/Tdap/Td series (2 - Td) 10/10/2019    PAP AKA CERVICAL CYTOLOGY -tubes tied last year; PAP done by Gyn  08/21/2020    Flu Vaccine (1)-advised to get this at her pharmacy (I do not like the flu shot) 09/01/2020     Wheezing with weather changing only  Denies fever/cough or SOB    Ibuprofen for headaches, menstrual cramps  Topamax did not work      Clothes fitting a bit bigger  Does not weigh herself    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 Authorizing Provider   PROAIR HFA 90 mcg/actuation inhaler Take 2 Puffs by inhalation every six (6) hours as needed for Wheezing. 12/4/19  Yes Suri Oneill MD   ibuprofen (MOTRIN) 800 mg tablet Take 1 Tab by mouth every eight (8) hours as needed for Pain. 2/27/18  Yes Drew Ramírez MD     Patient Active Problem List    Diagnosis Date Noted    Asthma 12/28/2015    Migraine without aura and without status migrainosus, not intractable 07/13/2015    Morbid obesity ([facility] [address] 07/25/2012       ROS  Pt denies: Wt loss, Fever/Chills, HA, Visual changes, Fatigue, Chest pain, SOB, COYLE, Abd pain, N/V/D/C, Blood in stool or urine, Edema. Pertinent positive as above in HPI. All others were negative  Objective:   No flowsheet data found. General: alert, cooperative, no distress   Mental  status: normal mood, behavior, speech, dress, motor activity, and thought processes, able to follow commands   HENT: NCAT   Neck: no visualized mass   Resp: no respiratory distress   Neuro: no gross deficits   Skin: no discoloration or lesions of concern on visible areas   Psychiatric: normal affect, consistent with stated mood, no evidence of hallucinations     Additional exam findings: obese      We discussed the expected course, resolution and complications of the diagnosis(es) in detail. Medication risks, benefits, costs, interactions, and alternatives were discussed as indicated.   I advised her to contact the office if her condition worsens, changes or fails to improve as anticipated. She expressed understanding with the diagnosis(es) and plan. Paula Beck, who was evaluated through a patient-initiated, synchronous (real-time) audio-video encounter, and/or her healthcare decision maker, is aware that it is a billable service, with coverage as determined by her insurance carrier. She provided verbal consent to proceed: Yes, and patient identification was verified. It was conducted pursuant to the emergency declaration under the [address] [place] [address], [address] [place] [address] authority and the [employer] General Act. A caregiver was present when appropriate. Ability to conduct physical exam was limited. I was at home. The patient was at home.       Abril Avila MD

## 2020-10-12 NOTE — PATIENT INSTRUCTIONS
Asthma Action Plan: After Your Visit Your Care Instructions An asthma action plan is based on peak flow and asthma symptoms. Sorting symptoms and peak flow into red, yellow, and green \"zones\" can help you know how bad your asthma is and what actions you should take. Work with your doctor to make your plan. An action plan may include: · The peak flow readings and symptoms for each zone. · What medicines to take in each zone. · When to call a doctor. · A list of emergency contact numbers. · A list of your asthma triggers. Follow-up care is a key part of your treatment and safety. Be sure to make and go to all appointments, and call your doctor if you are having problems. It's also a good idea to know your test results and keep a list of the medicines you take. How can you care for yourself at home? · Take your daily medicines to help minimize long-term damage and avoid asthma attacks. · Check your peak flow every morning and evening. This is the best way to know how well your lungs are working. · Check your action plan to see what zone you are in. 
¨ If you are in the green zone, keep taking your daily asthma medicines as prescribed. ¨ If you are in the yellow zone, you may be having or will soon have an asthma attack. You may not have any symptoms, but your lungs are not working as well as they should. Take the medicines listed in your action plan. If you stay in the yellow zone, your doctor may need to increase the dose or add a medicine. ¨ If you are in the red zone, follow your action plan. If your symptoms or peak flow don't improve soon, you may need to go to the emergency room or be admitted to the hospital. 
· Use an asthma diary. Write down your peak flow readings in the asthma diary. If you have an attack, write down what caused it (if you know), the symptoms, and what medicine you took.  
· Make sure you know how and when to call your doctor or go to the hospital. 
 · Take both the asthma action plan and the asthma diaryalong with your peak flow meter and medicineswhen you see your doctor. Tell your doctor if you are having trouble following your action plan. When should you call for help? Call 911 anytime you think you may need emergency care. For example, call if: 
· You have severe trouble breathing. Call your doctor now or seek immediate medical care if: 
· Your symptoms do not get better after you have followed your asthma action plan. · You cough up yellow, dark brown, or bloody mucus (sputum). Watch closely for changes in your health, and be sure to contact your doctor if: 
· Your coughing and wheezing get worse. · You need to use quick-relief medicine on more than 2 days a week (unless it is just for exercise). · You need help figuring out what is triggering your asthma attacks. Where can you learn more? Go to [employer] Enter 274 [id number] in the search box to learn more about \"Asthma Action Plan: After Your Visit. \"  
© 4909-6816 [employer], [employer]. Care instructions adapted under license by [facility] (which disclaims liability or warranty for this information). This care instruction is for use with your licensed healthcare professional. If you have questions about a medical condition or this instruction, always ask your healthcare professional. [place] [address] any warranty or liability for your use of this information. Content Version: 00.6.990425; Last Revised: March 9, 2012

## 2021-03-16 DIAGNOSIS — N94.6 MENSTRUAL CRAMPS: ICD-10-CM

## 2021-03-16 RX ORDER — IBUPROFEN 800 MG/1
800 TABLET ORAL
Qty: 60 TAB | Refills: 3 | Status: SHIPPED | OUTPATIENT
Start: 2021-03-16

## 2021-04-29 ENCOUNTER — OFFICE VISIT (OUTPATIENT)
Dept: SURGERY | Age: 36
End: 2021-04-29
Payer: MEDICAID

## 2021-04-29 VITALS
DIASTOLIC BLOOD PRESSURE: 102 MMHG | SYSTOLIC BLOOD PRESSURE: 156 MMHG | BODY MASS INDEX: 44.41 KG/M2 | WEIGHT: 293 LBS | OXYGEN SATURATION: 98 % | HEIGHT: 68 IN | TEMPERATURE: 97.8 F

## 2021-04-29 DIAGNOSIS — E66.01 MORBID OBESITY WITH BMI OF 60.0-69.9, ADULT (HCC): Primary | ICD-10-CM

## 2021-04-29 PROCEDURE — 99205 OFFICE O/P NEW HI 60 MIN: CPT | Performed by: SURGERY

## 2021-04-29 RX ORDER — DIPHENHYDRAMINE HCL 25 MG
25 TABLET ORAL
COMMUNITY

## 2021-04-29 NOTE — PROGRESS NOTES
Leopoldo Righter is a 37 y.o. female (: 1985) presenting to address:    Chief Complaint   Patient presents with    New Patient     GBP Consult       Medication list and allergies have been reviewed with Leopoldo Righter and updated as of today's date. I have gone over all Medical, Surgical and Social History with Leopoldo Rightkaden and updated/added the information accordingly.

## 2021-04-29 NOTE — PROGRESS NOTES
Consult    Patient: Terry Lisa MRN: [id number]  SSN: [id number]    YOB: 1985  Age: 37 y.o. Sex: female      Initial  Consultation for Bariatric Surgery     Terry Lisa is a 3year-old black female who presents for discussion of surgical options for definitive treatment of her super, super, super obesity. Onset obesity: Childhood:  Weight at age 27: 250 pounds on a 5 foot 8 inch frame  Maximum/current weight: 519 pounds and a 5 foot 8 frame with a body mass index of 79  Pattern/progression of weight gain: Slowly progressive interrupted by dietary weight loss followed by regain of lost weight as well as additional weight thus exhibiting the yoyo effect of her current maximum weight of 519 pounds. Max medical weight loss attempts:  Multiple unsupervised and supervised weight loss trials with a maximum loss of 60 pounds occurring in 2019 over 12 months. Comorbidities: Reactive airway disease, clinical obstructive sleep apnea, weight related arthropathy-back, knees  Current weight: 519 BMI: 79  Ideal body weight: 143  Excess body weight: 376  Estimated postsurgical weight loss: 301  Estimated postsurgical goal weight: 218  Allergies: Bactrim, iodine,  Current medications: See medication list  Past medical history:  1. Super, super, super obesity with body mass index of 79 with obesity related comorbidities reactive airway disease, clinical obstructive sleep apnea, weight related arthropathy-back, knees  2. Migraine cephalgia  Past surgical history:  1 multiple subcutaneous abscesses status post I&D  2. Bilateral tubal ligation 2019  Social history: Denies utilization with tobacco and alcohol  Family history:   Mother  46-DVT/PE  Father 55-healthy  Siblings x4-healthy    Allergies   Allergen Reactions    Bactrim [Sulfamethoprim Ds] Other (comments)     Blisters-Abdirahman Christian's    Iodine Swelling and Hives    Sulfamethoxazole-Trimethoprim Unknown (comments)     Pt states inside of her mouth feels raw when she takes Bactrim. Current Outpatient Medications on File Prior to Visit   Medication Sig Dispense Refill    diphenhydrAMINE (Benadryl Allergy) 25 mg tablet Take 25 mg by mouth every six (6) hours as needed.  ibuprofen (MOTRIN) 800 mg tablet Take 1 Tab by mouth every eight (8) hours as needed for Pain. 60 Tab 3    ProAir HFA 90 mcg/actuation inhaler Take 2 Puffs by inhalation every six (6) hours as needed for Wheezing. 1 Inhaler 3     No current facility-administered medications on file prior to visit. Past Medical History:   Diagnosis Date    Abscess and cellulitis     recurrent since age 17    Abscess and cellulitis     Asthma     since she was a child    Headache, common migraine        Past Surgical History:   Procedure Laterality Date    HX TUBAL LIGATION  2019       Social History     Tobacco Use    Smoking status: Never Smoker    Smokeless tobacco: Never Used   Substance Use Topics    Alcohol use: Yes     Alcohol/week: 0.0 standard drinks    Drug use: Yes     Types: Marijuana       Family History   Problem Relation Age of Onset    Bleeding Prob Mother     Cancer Maternal Grandmother     Stroke Maternal Grandmother          Review of Systems:      General: Denies fevers, chills, night sweats, fatigue, weight loss, or weight gain.     HEENT: Denies changes in auditory or visual acuity, recurrent pharyngitis, epistaxis, chronic rhinorrhea, vertigo    Respiratory: Denies increasing shortness of breath, productive cough, hemoptysis    Cardiac: Denies known history of cardiac disease, heart murmur, palpitations    GI: Denies dysphagia, recurrent emesis, hematemesis, changes in bowel habits, hematochezia, melena    : Denies hematuria frequency urgency dysuria    Musculoskeletal: Denies fractures, dislocations    Neurologic: Denies history of CVA, paralysis paresthesias, recurrent cephalgia, seizures    Endocrine: Denies polyuria, polydipsia, polyphagia, heat and cold intolerance    Lymph/heme: Denies a history of malignancy, anemia, bruising, blood transfusions    Integumentary: Negative for dermatitis         Physical Exam    Visit Vitals  BP (!) 151/115 (BP 1 Location: Right lower arm, BP Patient Position: Sitting)   Temp 97.8 °F (36.6 °C)   Ht 5' 8\" (1.727 m)   Wt (!) 235.4 kg (519 lb)   LMP 04/10/2021 (Approximate)   SpO2 98%   BMI 78.91 kg/m²       Nursing note reviewed. General: Clinically severely obese in no acute distress, nontoxic in appearance. Head: Normocephalic, atraumatic  Mouth: Clear, no overt lesions, oral mucosa is pink and moist.  Neck: Supple, no masses, no adenopathy or carotid bruits, trachea midline  Resp: Clear to auscultation bilaterally, no wheezing, rhonchi, or rales, excursions normal and symmetrical.  Cardio: Regular rate and rhythm, no murmurs, clicks, gallops, or rubs. Abdomen: Obese, soft, nontender, nondistended, normoactive bowel sounds, no hernias. Extremities: Warm, well perfused, no tenderness or swelling, normal gait/station, without edema or varicosities  Neuro: Sensation and strength grossly intact and symmetrical.  Psych: Alert and oriented to person, place, and time. Impression/Plan:    24-year-old black female with a body mass index of 79 with obesity related comorbidities reactive airway disease, clinical obstructive sleep apnea, weight related arthropathy-back, knees who would benefit from bariatric surgery. We have had an extensive discussion with regard to the risks, benefits and likely outcomes of the operation. We've discussed the restrictive and malabsorptive nature of the gastric bypass and compared and contrasted with the sleeve gastrectomy. The patient understands the likelihood of losing approximately 80% of their excess weight in 12 to 18 months.   The patient also understands the risks including but not limited to bleeding, infection, need for reoperation, ulcers, leaks and strictures, bowel obstruction secondary to adhesions and internal hernias, DVT, PE, heart attack, stroke, and death. Patient also understands risks of inadequate weight loss, excess weight loss, vitamin insufficiency, protein malnutrition, excess skin, and loss of hair. We have reviewed the components of a successful postoperative course including requirement for a high protein, low carbohydrate diet, 60 oz a day of zero calorie liquids, daily vitamin supplementation, daily exercise, regular follow-up, and participation in support groups.  At this time we will enroll the patient in our bariatric program, undertake routine laboratory evaluation, chest X-ray, EKG, possible UGI and evaluation by  nutritionist as well as psychologist and pending their satisfactory completion of the preop evaluation, plan to pursue laparoscopic potentially open gastric bypass to achieve definitive durable weight loss on a personal level with expected resolution of obesity related comorbidities

## 2021-05-06 ENCOUNTER — HOSPITAL ENCOUNTER (OUTPATIENT)
Dept: BARIATRICS/WEIGHT MGMT | Age: 36
Discharge: HOME | End: 2021-05-06

## 2021-05-07 NOTE — PROGRESS NOTES
[facility], [address]    Patient's Name: Isaias King   Age: 37 y.o. YOB: 1985   Sex: female    Date:   5/6/2021    Session: 1 of  6  Surgeon:  Angelo Bumpers    Height: 68\" Weight:    519      Lbs  BMI: 78/9     Starting Weight: 519     Overall Pounds Lost: 0   Overall Pounds Gained: 0      Do you smoke? Yes. Alcohol intake: Yes  Number of drinks at a time:  1-2  Number of times a week: occasional, not weekly    Class Guidelines    Guidelines are reviewed with patient at the start of every class. 1. Patient understands that weight loss trial classes must be consecutive. Patient understands if they miss a class, it is their responsibility to contact me to reschedule class. I will reach out to patient after their first no show. 2.  Patient understands the expectations that weight maintenance/weight loss is expected during the classes. Failure to demonstrate changes may result in one extra month of weight loss trial, followed by going back to see the surgeon. 3. Patient is also instructed to be doing their labs, blood work, psych visit, support group and any other test that the surgeon has used while they are working on their weight loss trial.    Changes Made: Changed eating habits and stopped smoking marijuana      Dietary Instruction    During today's class, we continued to focus on the key diet principles. Patient was instructed to follow a low carbohydrate diet, focusing on meat and vegetables. Patient was instructed to stop liquid calories and aim for 64 ounces of water per day. We focused on focusing in on bigger problem areas to start making changes to, such as reducing fast food intake, reducing carbonated beverages/soda intake, decreasing carbohydrates intake daily, etc. We reviewed protein shakes and high protein yogurts to chose, as well.  Patient was educated on good breakfast ideas and high protein snacks. I also reviewed with patient tips on practicing portion control. We covered various portion sizes for protein, starches, veggies, and desserts. Patient was educated on the importance of keeping a food journal/food tracking, not skipping meals, eating slowly, portioning out snacks, and choices to make when dining out or at fast food spots. Tips included taking 20 minutes to eat a meal, portion snacks out into a bowl or bag, portion control guide, using smaller plates and utensils, and splitting meals when dining out. Patient understands that surgery is only a tool in weight loss and incorporating portion control will help patient be most successful with long term weight loss. Patient's diet habits include: Eating 1-2 meals daily, consisting of chicken, rice and cabbage. Biggest portion: meat. Carbs: bread, chips, fruit juice, popcorn. Snacking 2x/day on chips or popcorn. Struggling to let go of fruit juice. Drinking 128 oz of water daily and 24 oz of juice. Physical Activity/Exercise    Comments:     Currently for exercise, patient does not have a consistent exercise regimen at this time. We talked about activities for patient to do, including walking, swimming, or chair exercises, as well as some additional steps to take in the day to get extra movement, such as parking further away, walking dog, taking stairs vs elevator, etc. Patient was encouraged to start up an exercise routine and build on it. Behavior Modification  Comments:   Emphasized the importance of eating slowly, not eating and drinking meals at the same time. Discussed Key Behavior principles to start implementing to be successful following surgery, such as, importance of 3 meals daily, keeping a food journal, avoid distractions during meal times, and chewing food thoroughly, taking 20-30 minutes to eat a meal, as a few examples.  Patient understands the importance of following through with these behaviors following surgery to aid in long term weight loss. Tips and advice were given on how to start implementing these into the patient's life. Patient's S.M.A.R.T. Goals are:  1. Eat more veggies  2. Walk more  3. Cut out bread    Patient has not attended a support group meeting.       Liza Terry, AMAN  5/7/2021

## 2021-06-09 ENCOUNTER — HOSPITAL ENCOUNTER (OUTPATIENT)
Dept: BARIATRICS/WEIGHT MGMT | Age: 36
Discharge: HOME | End: 2021-06-09

## 2021-06-09 NOTE — PROGRESS NOTES
[address] [facility]  [facility], [address]    Patient's Name: José Miguel Almonte   Age: 37 y.o. YOB: 1985   Sex: female    Date:   6/9/2021    Session: 2 of  6  Surgeon:  Humberto Oswald    Height: 68\" Weight:    513      Lbs  BMI: 77.9     Starting Weight: 519     Overall Pounds Lost: 6   Overall Pounds Gained: 0      Do you smoke? Quit smoking. Alcohol intake: Yes  Number of drinks at a time:  1-2  Number of times a week: 1 rarely    Class Guidelines    Guidelines are reviewed with patient at the start of every class. 1. Patient understands that weight loss trial classes must be consecutive. Patient understands if they miss a class, it is their responsibility to contact me to reschedule class. I will reach out to patient after their first no show. 2.  Patient understands the expectations that weight maintenance/weight loss is expected during the classes. Failure to demonstrate changes may result in one extra month of weight loss trial, followed by going back to see the surgeon. 3. Patient is also instructed to be doing their labs, blood work, psych visit, support group and any other test that the surgeon has used while they are working on their weight loss trial.    Changes Made: Has quit smoking marijuana      Dietary Instruction    During today's class, we continued to focus on the key diet principles. Patient was instructed to follow a low carbohydrate diet, focusing on meat and vegetables. Patient was instructed to stop liquid calories and aim for 64 ounces of water per day. We focused on focusing in on bigger problem areas to start making changes to, such as reducing fast food intake, reducing carbonated beverages/soda intake, decreasing carbohydrates intake daily, etc. We reviewed protein shakes and high protein yogurts to chose, as well.      We also reviewed some busted some myths associated with Bariatric surgery during today's webinar. The myths we covered include:  1. Anyone is eligible for surgery - There is a list of criteria and must show the interdisciplinary team that you are actively making changes with diet, exercise and food behaviors. 2. Bariatric Surgery is taking the \"easy way out\" -- Surgery is just a tool. Habits must be changed and this is a life-long process. Surgery can be viewed as a jumpstart. 3. After getting surgery, you can go back to your old eating habits -- Your body will most likely not allow this, due to symptoms such as dumping syndrome, nausea, and vomiting that may come about when eating the wrong foods or amounts. 4. Bariatric Surgery can lead to a transfer of addictions -- Addictions may come about for multiple reasons. It may be important to speak with a mental health professional if you have a family history of addictions. 5. Patients will have nutritional deficiencies after surgery -- As long as patients are taking all recommended vitamins daily, deficiencies are not likely. 6. I will be able to follow a Keto diet or Intermittent fasting -- We stress the importance of following a diet that is high in protein and low in fat and sugars/carbs after surgery, as well as ensuring consistent protein intake throughout the day to keep blood sugars controlled. 7. I am going to constantly be hungry after surgery -- The portion sizes after sugery may seem small to patients prior to surgery, however, the small portions will be more than enough to keep you full and satisfied at each meal.   8. I should not get pregnant after having bariatric surgery -- Pregnancy may actually be safer for both mom and baby after surgery. It is recommended to wait 18 months post-op to conceive. Patient was educated on the bariatric process within nutrition, and how to avoid and weed through some inaccurate information and opinions that they may read on the Internet regarding bariatric surgery.      Patient's dietary habits include: Eating 3 meals daily, consisting of chicken and a salad, with the biggest portion of meal coming from meat. Starches: chips, fruit juice, potatoes. Snacking 2x/day on chips. Struggling to completely let go of fruit juice. Drinking 128 oz of water daily and 16 oz of juice. Physical Activity/Exercise    Comments:     Currently for exercise, patient is walking 2-3x/week. We talked about activities for patient to do, including walking, swimming, or chair exercises, as well as some additional steps to take in the day to get extra movement, such as parking further away, walking dog, taking stairs vs elevator, etc. Patient was encouraged to start up an exercise routine and build on it. Behavior Modification  Comments:   Emphasized the importance of eating slowly, not eating and drinking meals at the same time. Discussed Key Behavior principles to start implementing to be successful following surgery, such as, importance of 3 meals daily, keeping a food journal, avoid distractions during meal times, and chewing food thoroughly, taking 20-30 minutes to eat a meal, as a few examples. Patient understands the importance of following through with these behaviors following surgery to aid in long term weight loss. Tips and advice were given on how to start implementing these into the patient's life. Patient's S.M.A.R.T. Goals are:  1. Walk more  2. Eat better  3. Be more active     Patient has not attended a support group meeting.       Jose Antonio Harp, AMAN  6/9/2021

## 2021-06-29 ENCOUNTER — OFFICE VISIT (OUTPATIENT)
Dept: FAMILY MEDICINE CLINIC | Age: 36
End: 2021-06-29
Payer: MEDICAID

## 2021-06-29 VITALS
SYSTOLIC BLOOD PRESSURE: 141 MMHG | OXYGEN SATURATION: 97 % | HEIGHT: 68 IN | TEMPERATURE: 98 F | DIASTOLIC BLOOD PRESSURE: 86 MMHG | HEART RATE: 103 BPM | WEIGHT: 293 LBS | RESPIRATION RATE: 16 BRPM | BODY MASS INDEX: 44.41 KG/M2

## 2021-06-29 DIAGNOSIS — J45.20 MILD INTERMITTENT ASTHMA WITHOUT COMPLICATION: ICD-10-CM

## 2021-06-29 DIAGNOSIS — I10 ESSENTIAL HYPERTENSION: Primary | ICD-10-CM

## 2021-06-29 DIAGNOSIS — E66.01 MORBID OBESITY (HCC): ICD-10-CM

## 2021-06-29 DIAGNOSIS — Z11.59 NEED FOR HEPATITIS C SCREENING TEST: ICD-10-CM

## 2021-06-29 DIAGNOSIS — N92.0 MENORRHAGIA WITH REGULAR CYCLE: ICD-10-CM

## 2021-06-29 PROCEDURE — 99214 OFFICE O/P EST MOD 30 MIN: CPT | Performed by: INTERNAL MEDICINE

## 2021-06-29 RX ORDER — ALBUTEROL SULFATE 90 UG/1
2 AEROSOL, METERED RESPIRATORY (INHALATION)
Qty: 1 INHALER | Refills: 3 | Status: SHIPPED | OUTPATIENT
Start: 2021-06-29 | End: 2022-08-24 | Stop reason: SDUPTHER

## 2021-06-29 RX ORDER — HYDROCHLOROTHIAZIDE 25 MG/1
25 TABLET ORAL DAILY
Qty: 90 TABLET | Refills: 1 | Status: SHIPPED | OUTPATIENT
Start: 2021-06-29 | End: 2021-12-07

## 2021-06-29 RX ORDER — ALBUTEROL SULFATE 90 UG/1
2 AEROSOL, METERED RESPIRATORY (INHALATION)
Qty: 1 INHALER | Refills: 1 | Status: CANCELLED | OUTPATIENT
Start: 2021-06-29

## 2021-06-29 NOTE — PROGRESS NOTES
Pt c/o heavy and irregular periods with severe cramping. Two periods in within the month. 1. Have you been to the ER, urgent care clinic since your last visit? Hospitalized since your last visit? Yes [facility] positive    2. Have you seen or consulted any other health care providers outside of the [address] [place] [address] since your last visit? Include any pap smears or colon screening.  Yes Dr. Sheryle Sandy Maintenance Due   Topic Date Due    Hepatitis C Screening  Never done    Pneumococcal 0-64 years (1 of 2 - PPSV23) Never done    COVID-19 Vaccine (1) Never done    Medicare Yearly Exam  Never done    DTaP/Tdap/Td series (2 - Td or Tdap) 10/10/2019    PAP AKA CERVICAL CYTOLOGY  08/21/2020     Patient request different inhaler d/t recall on Proair Inhaler

## 2021-06-29 NOTE — PROGRESS NOTES
Assessment/ Plan:   Diagnoses and all orders for this visit:    1. Essential hypertension-advised on low sodium diet/eat a banana a day; will consider changing to Spironolactone next visit bec she also has hirsutism  -     hydroCHLOROthiazide (HYDRODIURIL) 25 mg tablet; Take 1 Tablet by mouth daily. 2. Morbid obesity (HCC)-bariatric surgery eval ongoing  -     HEMOGLOBIN A1C WITH EAG; Future/also has strong family hx of DM    3. Mild intermittent asthma without complication-doing well on just prn Albuterol; declined Pneumovax  -     albuterol (PROVENTIL HFA, VENTOLIN HFA, PROAIR HFA) 90 mcg/actuation inhaler; Take 2 Puffs by inhalation every six (6) hours as needed for Wheezing. 4. Need for hepatitis C screening test  -     HEPATITIS C AB; Future    5. Menorrhagia with regular cycle. need for PAP  -     REFERRAL TO GYNECOLOGY        Follow-up and Dispositions    · Return in about 2 months (around 8/29/2021) for follow up.    Routing History            Chief Complaint   Patient presents with    Well Woman     c/o heavy and irregular periods    Dizziness     ongoing x1yr       Pt is a 37y.o. year old female who presents for follow up of her chronic medical problems    Health Maintenance Due   Topic Date Due    Hepatitis C Screening -with next labs Never done    Pneumococcal 0-64 years (1 of 2 - PPSV23)-declined Never done    COVID-19 Vaccine (1)-in Aug Never done    Medicare Yearly Exam -does not have Medicare Never done    DTaP/Tdap/Td series (2 - Td or Tdap)-2016 at [employer] for a job 10/10/2019    PAP AKA CERVICAL CYTOLOGY -will refer to gyn 08/21/2020       Wt Readings from Last 3 Encounters:   06/29/21 (!) 518 lb 9.6 oz (235.2 kg)   05/15/21 (!) 516 lb (234.1 kg)   04/29/21 (!) 519 lb (235.4 kg)       Has bariatric surgery coming up-Sept or Oct  Has decided on it this time; still nervous but not scared/has done her research    BP Readings from Last 3 Encounters:   06/29/21 (!) 141/86 05/15/21 117/77   04/29/21 (!) 156/102   repeat BP better with thigh cuff but still elevated; [place] in the low 100's  Family hx of HTN-yes    Asthma-no issues until she had Covid-needs Albuterol inhaler-Pro Air is recalled    Heavy menses-for a while now such that she has to line her bed; has to use tampon and pads and changing a lot for 4-7 days  Hx of BTL    Dizziness-from sitting for a while like she is going to faint; no syncope  Also feels room is spinning/sea sickness; no nausea    Lab Results   Component Value Date/Time    WBC 8.7 05/15/2021 12:20 AM    HGB 12.6 (L) 05/15/2021 12:20 AM    HCT 41.3 05/15/2021 12:20 AM    PLATELET 857 47/93/3119 12:20 AM    MCV 81.5 05/15/2021 12:20 AM     Hx of Covid on 5/14/2021-bronchitis, CXR neg    Son is type I DM  Lab Results   Component Value Date/Time    Glucose 106 05/15/2021 12:20 AM   No results found for: HBA1C, APY3PDXS, BWF2ELZS, XTU3POSG   Several family members with DM      Lab Results   Component Value Date/Time    Cholesterol, total 128 04/02/2019 10:40 AM    HDL Cholesterol 34 (L) 04/02/2019 10:40 AM    LDL, calculated 77 04/02/2019 10:40 AM    VLDL, calculated 17 04/02/2019 10:40 AM    Triglyceride 85 04/02/2019 10:40 AM       Needs to get tested for sleep study at some point per bariatric  Snores she admits  ROS:    Pt denies: Wt loss, Fever/Chills, HA, Visual changes, Fatigue, Chest pain, SOB, COYLE, Abd pain, N/V/D/C, Blood in stool or urine, Edema. Pertinent positive as above in HPI.  All others were negative    Patient Active Problem List   Diagnosis Code    Morbid obesity ([facility] [address] E66.01    Migraine without aura and without status migrainosus, not intractable G43.009    Asthma J45.909       Past Medical History:   Diagnosis Date    Abscess and cellulitis     recurrent since age 17    Abscess and cellulitis     Asthma     since she was a child    Headache, common migraine        Current Outpatient Medications   Medication Sig Dispense Refill    ProAir HFA 90 mcg/actuation inhaler Take 2 Puffs by inhalation every six (6) hours as needed for Wheezing. 1 Inhaler 1    diphenhydrAMINE (Benadryl Allergy) 25 mg tablet Take 25 mg by mouth every six (6) hours as needed.  ibuprofen (MOTRIN) 800 mg tablet Take 1 Tab by mouth every eight (8) hours as needed for Pain. (Patient not taking: Reported on 6/29/2021) 60 Tab 3       Social History     Tobacco Use   Smoking Status Never Smoker   Smokeless Tobacco Never Used       Allergies   Allergen Reactions    Bactrim [Sulfamethoprim Ds] Other (comments)     Blisters-Abdirahman Gino's    Iodine Hives and Swelling     Topical only    Sulfamethoxazole-Trimethoprim Unknown (comments)     Pt states inside of her mouth feels raw when she takes Bactrim. Patient Labs were reviewed: yes    Patient Past Records were reviewed: yes      Objective:     Vitals:    06/29/21 0841 06/29/21 0915   BP: (!) 170/95 (!) 141/86   Pulse: 94 (!) 103   Resp: 16    Temp: 98 °F (36.7 °C)    TempSrc: Temporal    SpO2: 97%    Weight: (!) 518 lb 9.6 oz (235.2 kg)    Height: 5' 8\" (1.727 m)      Body mass index is 78.85 kg/m². Exam:   Appearance: alert, well appearing,  oriented to person, place, and time, acyanotic, in no respiratory distress and well hydrated. HEENT:  NC/AT, pink conj, anicteric sclerae  Neck:  No cervical lymphadenopathy, no JVD, no thyromegaly, no carotid bruit  Heart:  RRR without M/R/G  Lungs:  CTAB, no rhonchi, rales, or wheezes with good air exchange   Abdomen:  Non-tender, pos bowel sounds, no hepatosplenomegaly  Ext:  No C/C/E    Skin: no rash  Neuro: no lateralizing signs, CNs II-XII intact            I have discussed the diagnosis with the patient and the intended plan as seen in the above orders. The patient has received an After-Visit Summary and questions were answered concerning future plans.      Medication Side Effects and Warnings were discussed with patient: yes    Patient verbalized understanding of above instructions.     Judy Seay MD  Internal Medicine  [address] [facility]

## 2021-06-29 NOTE — PATIENT INSTRUCTIONS
DASH Diet: Care Instructions  Your Care Instructions     The DASH diet is an eating plan that can help lower your blood pressure. DASH stands for Dietary Approaches to Stop Hypertension. Hypertension is high blood pressure. The DASH diet focuses on eating foods that are high in calcium, potassium, and magnesium. These nutrients can lower blood pressure. The foods that are highest in these nutrients are fruits, vegetables, low-fat dairy products, nuts, seeds, and legumes. But taking calcium, potassium, and magnesium supplements instead of eating foods that are high in those nutrients does not have the same effect. The DASH diet also includes whole grains, fish, and poultry. The DASH diet is one of several lifestyle changes your doctor may recommend to lower your high blood pressure. Your doctor may also want you to decrease the amount of sodium in your diet. Lowering sodium while following the DASH diet can lower blood pressure even further than just the DASH diet alone. Follow-up care is a key part of your treatment and safety. Be sure to make and go to all appointments, and call your doctor if you are having problems. It's also a good idea to know your test results and keep a list of the medicines you take. How can you care for yourself at home? Following the DASH diet  · Eat 4 to 5 servings of fruit each day. A serving is 1 medium-sized piece of fruit, ½ cup chopped or canned fruit, 1/4 cup dried fruit, or 4 ounces (½ cup) of fruit juice. Choose fruit more often than fruit juice. · Eat 4 to 5 servings of vegetables each day. A serving is 1 cup of lettuce or raw leafy vegetables, ½ cup of chopped or cooked vegetables, or 4 ounces (½ cup) of vegetable juice. Choose vegetables more often than vegetable juice. · Get 2 to 3 servings of low-fat and fat-free dairy each day. A serving is 8 ounces of milk, 1 cup of yogurt, or 1 ½ ounces of cheese. · Eat 6 to 8 servings of grains each day.  A serving is 1 slice of bread, 1 ounce of dry cereal, or ½ cup of cooked rice, pasta, or cooked cereal. Try to choose whole-grain products as much as possible. · Limit lean meat, poultry, and fish to 2 servings each day. A serving is 3 ounces, about the size of a deck of cards. · Eat 4 to 5 servings of nuts, seeds, and legumes (cooked dried beans, lentils, and split peas) each week. A serving is 1/3 cup of nuts, 2 tablespoons of seeds, or ½ cup of cooked beans or peas. · Limit fats and oils to 2 to 3 servings each day. A serving is 1 teaspoon of vegetable oil or 2 tablespoons of salad dressing. · Limit sweets and added sugars to 5 servings or less a week. A serving is 1 tablespoon jelly or jam, ½ cup sorbet, or 1 cup of lemonade. · Eat less than 2,300 milligrams (mg) of sodium a day. If you limit your sodium to 1,500 mg a day, you can lower your blood pressure even more. · Be aware that all of these are the suggested number of servings for people who eat 1,800 to 2,000 calories a day. Your recommended number of servings may be different if you need more or fewer calories. Tips for success  · Start small. Do not try to make dramatic changes to your diet all at once. You might feel that you are missing out on your favorite foods and then be more likely to not follow the plan. Make small changes, and stick with them. Once those changes become habit, add a few more changes. · Try some of the following:  ? Make it a goal to eat a fruit or vegetable at every meal and at snacks. This will make it easy to get the recommended amount of fruits and vegetables each day. ? Try yogurt topped with fruit and nuts for a snack or healthy dessert. ? Add lettuce, tomato, cucumber, and onion to sandwiches. ? Combine a ready-made pizza crust with low-fat mozzarella cheese and lots of vegetable toppings. Try using tomatoes, squash, spinach, broccoli, carrots, cauliflower, and onions. ?  Have a variety of cut-up vegetables with a low-fat dip as an appetizer instead of chips and dip. ? Sprinkle sunflower seeds or chopped almonds over salads. Or try adding chopped walnuts or almonds to cooked vegetables. ? Try some vegetarian meals using beans and peas. Add garbanzo or kidney beans to salads. Make burritos and tacos with mashed painter beans or black beans. Where can you learn more? Go to http://www.[employer].com/  Enter H967 in the search box to learn more about \"DASH Diet: Care Instructions. \"  Current as of: August 31, 2020               Content Version: 12.8  © 3959-1118 [employer]. Care instructions adapted under license by [employer] (which disclaims liability or warranty for this information). If you have questions about a medical condition or this instruction, always ask your healthcare professional. [address] any warranty or liability for your use of this information.

## 2021-07-07 ENCOUNTER — HOSPITAL ENCOUNTER (OUTPATIENT)
Dept: BARIATRICS/WEIGHT MGMT | Age: 36
Discharge: HOME | End: 2021-07-07

## 2021-07-08 NOTE — PROGRESS NOTES
[address] [facility]  [facility], [address]    Patient's Name: Albertina De   Age: 37 y.o. YOB: 1985   Sex: female    Date:   7/8/2021    Session: 3 of  6  Surgeon:  Tawanna Buenrostro    Height: 68\" Weight:    517      Lbs  BMI: 78     Previous Month's Weight: 513  Pounds Lost since last month: 0                 Pounds Gained since last month: 4    Starting Weight: 519     Overall Pounds Lost: 2   Overall Pounds Gained: 0      Do you smoke? Pt does not smoke    Alcohol intake: Pt does not drink  Number of drinks at a time:  0  Number of times a week: 0    Class Guidelines    Guidelines are reviewed with patient at the start of every class. 1. Patient understands that weight loss trial classes must be consecutive. Patient understands if they miss a class, it is their responsibility to contact me to reschedule class. I will reach out to patient after their first no show. 2.  Patient understands the expectations that weight maintenance/weight loss is expected during the classes. Failure to demonstrate changes may result in one extra month of weight loss trial, followed by going back to see the surgeon. 3. Patient is also instructed to be doing their labs, blood work, psych visit, support group and any other test that the surgeon has used while they are working on their weight loss trial.    Changes Made: Protein shakes in morning; water w lemon and mint or crystal light; no more sodas or juices! Dietary Instruction    During today's class, we continued to focus on the key diet principles. Patient was instructed to follow a low carbohydrate diet, focusing on meat and vegetables. Patient was instructed to stop liquid calories and aim for 64 ounces of water per day.  We focused on focusing in on bigger problem areas to start making changes to, such as reducing fast food intake, reducing carbonated beverages/soda intake, decreasing carbohydrates intake daily, etc. We reviewed protein shakes and high protein yogurts to chose, as well. We also reviewed some ways to stay on track with diet, food behavior, and exercise goals, which included:  1. Portion control and setting up plates with 1/2 veggies, 1/4 protein and 1/4 starch or fruit. a. Using smaller plates and utensils  b. Looking at servings sizes  2. Making healthy protein choices, with low-fat/lean sources of meat examples  3. Menu guide when dining out and choosing baked, grilled, broiled foods, and avoiding foods that are naturally higher in fat and carbohydrates, such as \"wendi, au gratin, battered, breaded, deep-fried\" items listed on the menus. a. Requesting sauces and dressings on the side. 4. Navigating through common food triggers, such as eating in front of the TV, raiding the fridge after coming home from work, and eating out of emotions. Action plans included making the TV a no eating zone, having more consistency with meals, and differentiating between emotional hunger and physical hunger. 5. Decreasing visual temptations tips such as sticking to a grocery list, shopping when not hungry, keeping problem foods away, and keeping healthier foods/snack items in closer reach. 6. Limiting umber of times eating throughout the day, places where eating and watching out for danger zones to help stay on track! 7. Keeping a food journal or food tracking montserrat to increase accountability  8. Starting up an exercise routine, as simple as walking 10-15 minutes/day to begin with, and building on it throughout the next few months. a. Stress reliever  b. Increase in metabolism  c. Reduces blood pressure  d. Continual weight loss  9. Getting a walking jess to help increase accountability. 10. Ways to prevent relapse, included:  a. Recognizing reactions  b. Come in with a plan  c. Remove temptations or environments that are conducive to temptations.     Patient's dietary habits include: Eating 3 meals daily, consisting of boiled eggs, liu and fruit; salad for lunch; chicken, broccoli and rice for dinner. Patient has found that she fills up on meats. Starches: fruit, pasta, popcorn, rice. Snacking 1x/day on fruit or popcorn. Struggling most w/ portion control. Drinking 128 oz of water daily, some flavored w/ crystal light or lemon and mint. Physical Activity/Exercise    Comments:     Currently for exercise, patient is walking - working on getting a consistent regimen in. We talked about activities for patient to do, including walking, swimming, or chair exercises, as well as some additional steps to take in the day to get extra movement, such as parking further away, walking dog, taking stairs vs elevator, etc. Patient was encouraged to start up an exercise routine and build on it. Behavior Modification  Comments:   Emphasized the importance of eating slowly, not eating and drinking meals at the same time. Discussed Key Behavior principles to start implementing to be successful following surgery, such as, importance of 3 meals daily, keeping a food journal, avoid distractions during meal times, and chewing food thoroughly, taking 20-30 minutes to eat a meal, as a few examples. Patient understands the importance of following through with these behaviors following surgery to aid in long term weight loss. Tips and advice were given on how to start implementing these into the patient's life. Patient's S.M.A.R.T. Goals are:  1. Exercise more  2. Eat smaller portions    Patient has not yet attended a support group meeting.       Terri Garcia, AMAN  7/8/2021

## 2021-08-11 ENCOUNTER — HOSPITAL ENCOUNTER (OUTPATIENT)
Dept: BARIATRICS/WEIGHT MGMT | Age: 36
Discharge: HOME | End: 2021-08-11

## 2021-08-11 NOTE — PROGRESS NOTES
[address] [facility]  [facility], [address]    Patient's Name: Damaris Giraldo   Age: 37 y.o. YOB: 1985   Sex: female    Date:   8/11/2021    Session: 4 of  6  Surgeon:  Kyra Rooney    Height: 68\" Weight:    518      Lbs  BMI: 78     Previous Month's Weight: 517  Pounds Lost since last month: 0                 Pounds Gained since last month: 1    Starting Weight: 519     Overall Pounds Lost: 2   Overall Pounds Gained: 0      Do you smoke? Pt does not smoke    Alcohol intake: occasional  Number of drinks at a time:  1-2  Number of times a week: not weekly    Class Guidelines    Guidelines are reviewed with patient at the start of every class. 1. Patient understands that weight loss trial classes must be consecutive. Patient understands if they miss a class, it is their responsibility to contact me to reschedule class. I will reach out to patient after their first no show. 2.  Patient understands the expectations that weight maintenance/weight loss is expected during the classes. Failure to demonstrate changes may result in one extra month of weight loss trial, followed by going back to see the surgeon. 3. Patient is also instructed to be doing their labs, blood work, psych visit, support group and any other test that the surgeon has used while they are working on their weight loss trial.    Changes Made: Walking exercise and watching food intake      Dietary Instruction    During today's class, we continued to focus on the key diet principles. Patient was instructed to follow a low carbohydrate diet, focusing on meat and vegetables. Patient was instructed to stop liquid calories and aim for 64 ounces of water per day.  We focused on focusing in on bigger problem areas to start making changes to, such as reducing fast food intake, reducing carbonated beverages/soda intake, decreasing carbohydrates intake daily, etc. We reviewed protein shakes and high protein yogurts to chose, as well. We also reviewed some ways to combat emotional eating and cravings, which included:  1. Recognizing what physical hunger feels like vs emotional hunger/cravings  2. Recognizing triggers, whether psychological, emotional or food-related. 3. Starting to listen to what your body is telling you and admitting that there is a habit in order to break it  4. Breaking the association of feelings w/ food and replacing with alternative activities in place of eating, such as meditation, exercise, crafting, new hobbies, positive self talk, etc.  5. Working on not skipping meals to reduce cravings. 6. Confrontation vs. Distraction strategies in fighting emotional eating and cravings  7. Ways to curb cravings, such as sucking on a sour pickle, making TV a no eating zone, fool your sweet tooth, etc.  8. Working on stress reduction and managing stress in areas not revolving around food. Patient's dietary habits include: Eating 3 meals daily, consisting of liu, boiled eggs; salad and meat and often finds herself filling up on meat. Starches consumed include rice, potatoes, corn. Snacking 1x/day on chips, popcorn, cookies and cakes. Struggling most w/ sweets. Drinking 128 oz water/day with 32 oz coming from [facility]. Is not longer drinking soda and fruit juice. Physical Activity/Exercise    Comments:     Currently for exercise, patient is walking 2-3x/week. We talked about activities for patient to do, including walking, swimming, or chair exercises, as well as some additional steps to take in the day to get extra movement, such as parking further away, walking dog, taking stairs vs elevator, etc. Patient was encouraged to start up an exercise routine and build on it. Behavior Modification  Comments:   Emphasized the importance of eating slowly, not eating and drinking meals at the same time.   Discussed Key Behavior principles to start implementing to be successful following surgery, such as, importance of 3 meals daily, keeping a food journal, avoid distractions during meal times, and chewing food thoroughly, taking 20-30 minutes to eat a meal, as a few examples. Patient understands the importance of following through with these behaviors following surgery to aid in long term weight loss. Tips and advice were given on how to start implementing these into the patient's life. Patient's S.M.A.R.T. Goals are:  1. Exercising more  2. No sweets at all  3. Managing food intake    Patient has not attended a support group meeting.       Rosalia Rosas, AMAN  8/11/2021

## 2021-09-08 ENCOUNTER — HOSPITAL ENCOUNTER (OUTPATIENT)
Dept: BARIATRICS/WEIGHT MGMT | Age: 36
Discharge: HOME | End: 2021-09-08

## 2021-09-08 ENCOUNTER — DOCUMENTATION ONLY (OUTPATIENT)
Dept: BARIATRICS/WEIGHT MGMT | Age: 36
End: 2021-09-08

## 2021-09-08 NOTE — PROGRESS NOTES
[address] Loss Ingrid Mckay [address] [facility], [address]    Patient's Name: Jojo Vera   Age: 37 y.o. YOB: 1985   Sex: female    Date:   9/8/2021    Insurance:              Session: 5 of  6  Surgeon:  Dr. Arthur Boothe    Height: 5 f 8   Weight:    517      Lbs. BMI: 78.7   Pounds Lost since last month: 0                Pounds Gained since last month: 0    Starting Weight: 519     Previous Months Weight: 519  Overall Pounds Lost: 2   Overall Pounds Gained: 0    Smoking:  None    Alcohol intake:  Number of drinks at a time:  NOne  Number of times a week: NOne    Class Guidelines    Guidelines are reviewed with patient at the start of every class. 1. Patient understands that weight loss trial classes must be consecutive. Patient understands if they miss a class, it is their responsibility to contact me to reschedule class. I will reach out to patient after their first no show. 2.  Patient understands the expectations that weight maintenance/weight loss is expected during the classes. Failure to demonstrate changes may result in one extra month of weight loss trial, followed by going back to see the surgeon. 3. Patient is also instructed to be doing their labs, blood work, psych visit, support group and any other test that the surgeon has used while they are working on their weight loss trial.    Other Pertinent Information:     Changes Made Since Last Class:     Eating Habits and Behaviors      During today's class, we continued to focus on the key diet principles. Patient was instructed to follow a low carbohydrate diet, focusing on meat and vegetables. Patient was instructed to stop liquid calories and aim for 64 ounces of water per day.  We focused on focusing in on bigger problem areas to start making changes to, such as reducing fast food intake, reducing carbonated beverages/soda intake, decreasing carbohydrates intake daily, etc. We reviewed protein shakes and high protein yogurts to chose, as well. During today's class, we also talked about how to read a label. Patient was given information on:  1. The benefits of reading a label, which allowed one to compare the nutritional value of similar products and make healthy food decisions. 2. The ingredient list, which can help to determine if the food is heathy or something that fits into the diet. 3. The importance of reading the serving size and making sure to apply that to the portion size that they are consuming. Patient was also educated on carbohydrates. I talked to patient about:  1. The function of carbohydrates. 2. Foods that carbohydrate-heavy. 3. Patient was given the guidelines to keep their carbohydrates less than 75 grams per day in the pre-op phase. 4. Patient was also given ideas of low carb swaps, which include zucchini noodles, spaghetti squash, or cauliflower rice. 5. Lower carbohydrate fruit options were discussed. 6. Discussed lower carb swaps to use instead of potato chips. Patient's dietary habits include: Patient is eating 3 meal per day. Meals are made up of boiled eggs, liu, sausage, chicken and vegetables. Portions are:  Dinner size plate. Patient is eating out: hardly ever. Patient is snacking on tuna packets, nuts. I have talked to patient about some lower carbohydrate snack choices that focused more protein. Patient is drinking 128 ounces of fluid per day. Fluid intake is make up of: water. Physical Activity/Exercise     Comments:     Currently for exercise, patient is doing 15 minutes on her bike. I have talked to patient about some suggestions to start an exercise routine. Patient is encouraged to purchase a pedometer and use this to track her steps. I have made some suggestions to patient of ways to incorporate exercise in with a busy lifestyle.   We also talked about You Tube videos that can be used for an exercise routine. Behavior Modification  Comments:  Behavior modifications were discussed with the patient. Some of those behavior modifications include:  1. Emphasized the importance of eating slowly, not eating and drinking meals at the same time. 2.  Taking 20-30 minutes to eat a meal  3. I have encouraged patient to follow journal, which may be done by paper or tracking it an montserrat, such as My Fitness Pal or [employer]. [address]. Patient understands the importance of following through with these behaviors following surgery to aid in long term weight loss. Tips and advice were given on how to start implementing these into the patient's life. Patient has not attended the required bariatric support group. Goal patient has set for next month:  1. Portion sizes  2.   Walk more    Roe Rubi Jacoob 87 RD  9/8/2021

## 2021-09-14 ENCOUNTER — HOSPITAL ENCOUNTER (OUTPATIENT)
Dept: LAB | Age: 36
Discharge: HOME | End: 2021-09-14

## 2021-09-14 ENCOUNTER — CLINICAL SUPPORT (OUTPATIENT)
Dept: SURGERY | Age: 36
End: 2021-09-14

## 2021-09-14 DIAGNOSIS — E66.01 MORBID OBESITY WITH BMI OF 60.0-69.9, ADULT (HCC): Primary | ICD-10-CM

## 2021-09-14 DIAGNOSIS — Z01.818 PRE-OP EVALUATION: ICD-10-CM

## 2021-09-14 LAB
XX-LABCORP SPECIMEN COL,LCBCF: NORMAL
XX-LABCORP SPECIMEN COL,LCBCF: NORMAL

## 2021-09-14 PROCEDURE — 99001 SPECIMEN HANDLING PT-LAB: CPT

## 2021-09-15 LAB
EST. AVERAGE GLUCOSE BLD GHB EST-MCNC: 126 MG/DL
HBA1C MFR BLD: 6 % (ref 4.8–5.6)
HCV AB S/CO SERPL IA: <0.1 S/CO RATIO (ref 0–0.9)
SPECIMEN STATUS REPORT, ROLRST: NORMAL

## 2021-09-21 LAB
25(OH)D3+25(OH)D2 SERPL-MCNC: 9.1 NG/ML (ref 30–100)
ALBUMIN SERPL-MCNC: 4 G/DL (ref 3.8–4.8)
ALBUMIN/GLOB SERPL: 1 {RATIO} (ref 1.2–2.2)
ALP SERPL-CCNC: 105 IU/L (ref 44–121)
ALT SERPL-CCNC: 16 IU/L (ref 0–32)
AST SERPL-CCNC: 15 IU/L (ref 0–40)
BILIRUB SERPL-MCNC: <0.2 MG/DL (ref 0–1.2)
BUN SERPL-MCNC: 11 MG/DL (ref 6–20)
BUN/CREAT SERPL: 12 (ref 9–23)
CALCIUM SERPL-MCNC: 9.5 MG/DL (ref 8.7–10.2)
CHLORIDE SERPL-SCNC: 98 MMOL/L (ref 96–106)
CHOLEST SERPL-MCNC: 122 MG/DL (ref 100–199)
CO2 SERPL-SCNC: 21 MMOL/L (ref 20–29)
CREAT SERPL-MCNC: 0.95 MG/DL (ref 0.57–1)
ERYTHROCYTE [DISTWIDTH] IN BLOOD BY AUTOMATED COUNT: 14.6 % (ref 11.7–15.4)
FERRITIN SERPL-MCNC: 17 NG/ML (ref 15–150)
FOLATE SERPL-MCNC: 8.6 NG/ML
GLOBULIN SER CALC-MCNC: 3.9 G/DL (ref 1.5–4.5)
GLUCOSE SERPL-MCNC: 104 MG/DL (ref 65–99)
HCT VFR BLD AUTO: 38.1 % (ref 34–46.6)
HDLC SERPL-MCNC: 35 MG/DL
HGB BLD-MCNC: 11.8 G/DL (ref 11.1–15.9)
IRON SERPL-MCNC: 28 UG/DL (ref 27–159)
LDLC SERPL CALC-MCNC: 66 MG/DL (ref 0–99)
MCH RBC QN AUTO: 24.9 PG (ref 26.6–33)
MCHC RBC AUTO-ENTMCNC: 31 G/DL (ref 31.5–35.7)
MCV RBC AUTO: 81 FL (ref 79–97)
PLATELET # BLD AUTO: 489 X10E3/UL (ref 150–450)
POTASSIUM SERPL-SCNC: 4.1 MMOL/L (ref 3.5–5.2)
PROT SERPL-MCNC: 7.9 G/DL (ref 6–8.5)
RBC # BLD AUTO: 4.73 X10E6/UL (ref 3.77–5.28)
SODIUM SERPL-SCNC: 139 MMOL/L (ref 134–144)
SPECIMEN STATUS REPORT, ROLRST: NORMAL
TRIGL SERPL-MCNC: 111 MG/DL (ref 0–149)
TSH SERPL DL<=0.005 MIU/L-ACNC: 3.68 UIU/ML (ref 0.45–4.5)
UREA BREATH TEST QL: NEGATIVE
VIT B1 BLD-SCNC: 132.8 NMOL/L (ref 66.5–200)
VIT B12 SERPL-MCNC: 240 PG/ML (ref 232–1245)
VLDLC SERPL CALC-MCNC: 21 MG/DL (ref 5–40)
WBC # BLD AUTO: 12.3 X10E3/UL (ref 3.4–10.8)

## 2021-09-27 ENCOUNTER — TELEPHONE (OUTPATIENT)
Dept: SURGERY | Age: 36
End: 2021-09-27

## 2021-09-29 ENCOUNTER — OFFICE VISIT (OUTPATIENT)
Dept: FAMILY MEDICINE CLINIC | Age: 36
End: 2021-09-29

## 2021-09-29 VITALS
HEART RATE: 95 BPM | DIASTOLIC BLOOD PRESSURE: 86 MMHG | BODY MASS INDEX: 44.41 KG/M2 | WEIGHT: 293 LBS | HEIGHT: 68 IN | SYSTOLIC BLOOD PRESSURE: 135 MMHG | TEMPERATURE: 98.2 F

## 2021-09-29 DIAGNOSIS — I10 ESSENTIAL HYPERTENSION: Primary | ICD-10-CM

## 2021-09-29 NOTE — PROGRESS NOTES
Patient seen for routine follow up for blood pressure. 1. Have you been to the ER, urgent care clinic since your last visit? Hospitalized since your last visit? No    2. Have you seen or consulted any other health care providers outside of the [address] [place] [address] since your last visit? Include any pap smears or colon screening. Yes Bariatric     Health Maintenance Due   Topic Date Due    COVID-19 Vaccine (1) Never done    Medicare Yearly Exam  Never done    DTaP/Tdap/Td series (2 - Td or Tdap) 10/10/2019    Flu Vaccine (1) 09/01/2021     Patient declined flu vaccine.

## 2021-10-07 ENCOUNTER — HOSPITAL ENCOUNTER (OUTPATIENT)
Dept: GENERAL RADIOLOGY | Age: 36
Discharge: HOME | End: 2021-10-07
Payer: MEDICAID

## 2021-10-07 ENCOUNTER — HOSPITAL ENCOUNTER (OUTPATIENT)
Dept: LAB | Age: 36
Discharge: HOME | End: 2021-10-07
Payer: MEDICAID

## 2021-10-07 DIAGNOSIS — E66.01 MORBID OBESITY WITH BMI OF 60.0-69.9, ADULT (HCC): ICD-10-CM

## 2021-10-07 LAB
ATRIAL RATE: 85 BPM
CALCULATED P AXIS, ECG09: 33 DEGREES
CALCULATED R AXIS, ECG10: 56 DEGREES
CALCULATED T AXIS, ECG11: 17 DEGREES
DIAGNOSIS, 93000: NORMAL
P-R INTERVAL, ECG05: 156 MS
Q-T INTERVAL, ECG07: 374 MS
QRS DURATION, ECG06: 84 MS
QTC CALCULATION (BEZET), ECG08: 445 MS
VENTRICULAR RATE, ECG03: 85 BPM

## 2021-10-07 PROCEDURE — 93005 ELECTROCARDIOGRAM TRACING: CPT

## 2021-10-07 PROCEDURE — 71046 X-RAY EXAM CHEST 2 VIEWS: CPT

## 2021-10-11 ENCOUNTER — DOCUMENTATION ONLY (OUTPATIENT)
Dept: BARIATRICS/WEIGHT MGMT | Age: 36
End: 2021-10-11

## 2021-10-11 NOTE — PROGRESS NOTES
[address] Loss [address] [facility], [address]    Patient's Name: Alise Galeana   Age: 31 y.o. YOB: 1985   Sex: female      Insurance:              Session: 6 of 6  Surgeon:  Dr. Justina Lopez    Height: 5 f8 Weight:    512      Lbs. BMI: 78.0   Pounds Lost since last month: 5               Pounds Gained since last month: 0    Starting Weight: 519   Previous Months Weight: 517  Overall Pounds Lost: 7 Overall Pounds Gained: 0      Do you smoke? Currently is not smoking. She states she was smoking marijuana, but stopped to prepare for the surgery. Alcohol intake:  Number of drinks at a time:  1  Number of times a week: 2 x a month    Class Guidelines    Guidelines are reviewed with patient at the start of every class. 1. Patient understands that weight loss trial classes must be consecutive. Patient understands if they miss a class, it is their responsibility to contact me to reschedule class. I will reach out to patient after their first no show. 2.  Patient understands the expectations that weight maintenance/weight loss is expected during the classes. Failure to demonstrate changes may result in one extra month of weight loss trial, followed by going back to see the surgeon. 3. Patient is also instructed to be doing their labs, blood work, psych visit, support group and any other test that the surgeon has used while they are working on their weight loss trial.  4. Patient is instructed to bring their education binder to all classes. Changes Made Since Last Class:     Eating Habits and Behaviors      Today we reviewed key diet principles. We talked about patient following a low calorie/low carbohydrate diet while they are in weight loss trials. To achieve this, patient is encouraged to avoid liquid calories, including alcohol, soda, sweet tea, and fruit juices.    Patient can cut carbohydrates by trying to stick to meat and vegetables. Patient can also eat eggs, cheese, and good fat, while trying to eliminate starches, such as pasta, rice, crackers, chips, popcorn. I also gave a power point that included 21 Ways to Stay on Track with a Healthy Lifestyle. Some of the food-related suggestions included drinking plenty of water or calorie-free beverages prior to their meal.  Patient is encouraged to to fill up on protein first, which is the ultimate fill-me up food. We talked about the importance of eating breakfast and the effects that can happen if one skips meals, which includes eating larger portions, snacking more, and decreasing their metabolism. With the suggestions in the power point, patient will be able to decrease their calories and carbohydrate intake. Patient's dietary habits include: Patient is eating 3-4 meals per day. Meals are made up of boiled eggs, liu, fruit, salad, . Portions are:  Saucer size now. Patient is eating out: hardly ever. Patient's intake of bread, rice, pasta or other carbohydrates is:  1 x a week. Patient is snacking on fruit or trial mix. Patient is eating sweets 0 times a week. I have talked to patient about some lower carbohydrate snack choices that focused more protein. Patient is drinking 64 ounces of fluid per day. Fluid intake is make up of: water only. Physical Activity/Exercise    Comments: We talked about the importance of establishing a work out routine. Patient is currently walking and started lifting weights for activity. Goals have been set. Behavior Modification       Comments:   Some of the behavior tips that were included in the power point, include being choosy about night time snacking. Patient was encouraged to make the TV a no eating zone and not eat after 7 pm.  Patient is also encouraged to keep a food journal.      One of the other things we talked about during class is whether or not patient has a support system. Patient has not been to a support group. Goals set by Registered Dietitian:  1. Exercise more  2. Be able to walk without back hurting.     Roe Ocasio Jacobo 87 RD  10/11/2021

## 2021-10-11 NOTE — PROGRESS NOTES
Nutrition Evaluation    Patient's Name: Phong Jiménez   Age: 31 y.o. YOB: 1985   Sex: female    Height: 5 f 8 Weight: 512 BMI:    Starting Weight:  519        Smoking Status:  None  Alcohol Intake:  Number of Drinks at a Time: 0  Number of Times a Week: None    Changes made during classes include:  Stopped smoking marijuana   Walking some  Stopped sweets    Summary:  I feel that Phong Jiménez has demonstrated appropriate diet changes and is ready to move forward with surgery. Patient has been briefed on the importance of the protein drinks, vitamins, and the transition of the diet stages. Patient understands that the long-term diet will focus on protein and vegetables. Patient understand the effects of carbohydrates after surgery and what reactive hypoglycemia is. Patient is aware that they will be attending pre-op class 2 weeks before surgery and will get more detailed information on the post-op diet guidelines. Patient will see me again at 6 weeks post-op. At this 6 week visit, RD will assess how patient is tolerating soft protein and advance to vegetables, if tolerating soft protein without difficulty. Patient will also see RD again at 9 months post-op. This visit will assess patient's compliance with current protocol, including diet, vitamins, protein shakes, and exercise. Post-op diet guidelines will be reinforced. RD is available for questions and to meet with patient outside of the 6 week and 9 month post-op visit. We spent a lot of time talking about the vitamins. Patient understands the importance of being compliant with the diet protocol and the complications and risks that can occur if they are non-compliant with the nutritional protocol. Patient has attended at least one support group.     Candidate for surgery: Yes  Re-evaluation Date:     Procedure:  Gastric Bypass     Roe Dye 87 RD  10/11/2021

## 2021-10-12 ENCOUNTER — HOSPITAL ENCOUNTER (OUTPATIENT)
Dept: BARIATRICS/WEIGHT MGMT | Age: 36
Discharge: HOME | End: 2021-10-12

## 2021-10-28 ENCOUNTER — OFFICE VISIT (OUTPATIENT)
Dept: SURGERY | Age: 36
End: 2021-10-28
Payer: MEDICAID

## 2021-10-28 ENCOUNTER — TELEPHONE (OUTPATIENT)
Dept: SURGERY | Age: 36
End: 2021-10-28

## 2021-10-28 VITALS
WEIGHT: 293 LBS | TEMPERATURE: 97.8 F | HEIGHT: 68 IN | SYSTOLIC BLOOD PRESSURE: 149 MMHG | HEART RATE: 111 BPM | BODY MASS INDEX: 44.41 KG/M2 | DIASTOLIC BLOOD PRESSURE: 85 MMHG | RESPIRATION RATE: 20 BRPM

## 2021-10-28 DIAGNOSIS — Z01.818 PRE-OP EVALUATION: Primary | ICD-10-CM

## 2021-10-28 DIAGNOSIS — E66.01 MORBID OBESITY WITH BODY MASS INDEX OF 70 AND OVER IN ADULT (HCC): Primary | ICD-10-CM

## 2021-10-28 PROCEDURE — 99214 OFFICE O/P EST MOD 30 MIN: CPT | Performed by: SURGERY

## 2021-10-28 NOTE — PROGRESS NOTES
Preop History and Physical Exam:    Juan Steele is a 31 y.o. black female initially evaluated in this office April 29, 2021 for discussion surgical options over the definitive management of her super, super, super obesity. The patient at that time weighed 509 pounds on a 5 foot 8 inch frame with a body mass index of 77 with obesity related comorbidities of prediabetes, reactive airway disease, clinical obstructive sleep apnea, weight related arthropathy. Patient after discussing surgical options elected pursue laparoscopic tensely open Chela-en-Y gastric bypass to achieve definitive durable weight loss on a personal level expected resolution of obesity related comorbidities. Patient presents today in follow-up after completing all bariatric surgery multidisciplinary programmatic requirements necessary prior to the pursuit of surgery for review the diagnostic evaluation and surgical scheduling noting no new medical or surgical history. Patient currently weighs 509 pounds on a 5 foot 8 inch frame with a body mass index of 77 with obesity related comorbidities of prediabetes, reactive airway disease, clinical obstructive sleep apnea and weight related arthropathy. Ideal body weight 142 pounds, excess body weight 367 pounds, estimated postsurgical weight loss based on 8% loss of excess body weight 294 pounds, postsurgical goal weight 216 pounds    Past Medical History:   Diagnosis Date    Abscess and cellulitis     recurrent since age 17    Abscess and cellulitis     Asthma     since she was a child    Headache, common migraine        Past Surgical History:   Procedure Laterality Date    HX TUBAL LIGATION  2019       Current Outpatient Medications   Medication Sig Dispense Refill    hydroCHLOROthiazide (HYDRODIURIL) 25 mg tablet Take 1 Tablet by mouth daily.  90 Tablet 1    albuterol (PROVENTIL HFA, VENTOLIN HFA, PROAIR HFA) 90 mcg/actuation inhaler Take 2 Puffs by inhalation every six (6) hours as needed for Wheezing. 1 Inhaler 3    diphenhydrAMINE (Benadryl Allergy) 25 mg tablet Take 25 mg by mouth every six (6) hours as needed.  ibuprofen (MOTRIN) 800 mg tablet Take 1 Tab by mouth every eight (8) hours as needed for Pain. (Patient not taking: Reported on 6/29/2021) 60 Tab 3       Allergies   Allergen Reactions    Bactrim [Sulfamethoprim Ds] Other (comments)     Blisters-Abdirahman Gino's    Iodine Hives and Swelling     Topical only    Sulfamethoxazole-Trimethoprim Unknown (comments)     Pt states inside of her mouth feels raw when she takes Bactrim. Social History     Tobacco Use    Smoking status: Never Smoker    Smokeless tobacco: Never Used   Substance Use Topics    Alcohol use:  Yes     Alcohol/week: 0.0 standard drinks    Drug use: Yes     Types: Marijuana       Family History   Problem Relation Age of Onset    Bleeding Prob Mother     Cancer Maternal Grandmother     Stroke Maternal Grandmother        Review of Systems:  Positive in BOLD    CONST: Fever, weight loss, fatigue or chills  GI: Nausea, vomiting, abdominal pain, change in bowel habits, hematochezia, melena, and GERD   INTEG: Dermatitis, abnormal moles  HEENT: Recent changes in vision, vertigo, epistaxis, dysphagia and hoarseness  CV: Chest pain, palpitations, HTN, edema and varicosities  RESP: Cough, shortness of breath, wheezing, hemoptysis, snoring and reactive airway disease  : Hematuria, dysuria, frequency, urgency, nocturia and stress urinary incontinence   MS: Weakness, joint pain and arthritis  ENDO: Diabetes, thyroid disease, polyuria, polydipsia, polyphagia, poor wound healing, heat intolerance, cold intolerance  LYMPH/HEME: Anemia, bruising and history of blood transfusions  NEURO: Dizziness, headache, fainting, seizures and stroke  PSYCH: Anxiety and depression    Physical Exam    Visit Vitals  BP (!) 149/85 (BP 1 Location: Left upper arm, BP Patient Position: At rest, BP Cuff Size: Adult)   Pulse (!) 111 Temp 97.8 °F (36.6 °C) (Oral)   Resp 20   Ht 5' 8\" (1.727 m)   Wt (!) 230.9 kg (509 lb)   BMI 77.39 kg/m²         General: Super, super, super obesity with body mass index of [age]-year-old black female in no acute distress  Head: Normocephalic, atraumatic  Mouth: Clear, no overt lesions, oral mucosa pink and moist  Cardio: Regular rate and rhythm, no murmurs, clicks, gallops, or rubs. No edema or varicosities. Abdomen: Obese, soft, nontender, nondistended, normoactive bowel sounds, no hernias, no hepatosplenomegaly,   Psych: Alert and oriented to person, place, and time. Session 14 2021 CBC, CMP, cholesterol panel, TSH, vitamin B1, B12, folate, iron, vitamin D, H. pylori breath test: Vitamin D 9.1, white blood cell count 12.3, glucose 104, hemoglobin A1c 6.0 with otherwise normal laboratory parameters  September 29, 2021 Pap smear: No evidence of malignancy  October 7, 2021 chest x-ray: Normal  October 11, 2021 bariatric nutrition/Montes De Oca: Concurring with pursuit of surgery  June 22, 2021 psychology/Jong: Concurrent pursuit of surgery  October 7, 2021 EKG: Normal sinus rhythm with rate of 85, baseline wander otherwise normal    Impression:    Nerissa Gaines is a 31 y.o. black female with a body mass index of 77 with obesity related comorbidities of prediabetes, reactive airway disease, clinical obstructive sleep apnea and weight related arthropathy w ho would benefit from bariatric surgery. We've discussed the restrictive and malabsorptive nature of the gastric bypass and compared and contrasted with the sleeve gastrectomy. The patient understands the likelihood of losing approximately 80% of their excess weight in 12 to 18 months. She also understands the risks including but not limited to bleeding, infection, need for reoperation, anastomotic ulcers, leaks and strictures, bowel obstruction secondary to adhesions and internal hernias, DVT, PE, heart attack, stroke, and death.  Patient also understands risks of inadequate weight loss, excess weight loss, vitamin insufficiency, protein malnutrition, excess skin, and loss of hair. We have reviewed the components of a successful postoperative course including requirement for a high protein, low carbohydrate diet, 60 oz a day of zero calorie liquids, daily vitamin supplementation, daily exercise, regular follow-up, and participation in support groups. We have reviewed the preoperative liver shrinking clear liquid diet, as well as reviewed any medication changes required while on the clear liquid diet. In addition, the patient understands that all medications to be taken during the first 8 weeks postoperatively can be taken whole as long as the medication is approximately the size of a Disha 325 mg aspirin tablet in size. The patient further understands that it is his/her responsibility to review these and verify with their primary care doctor and pharmacist that all medications are of the appropriate size. We will schedule the patient for attempted laparoscopic potentially open Chela-en-Y gastric bypass with the potential if technically too difficult to pursue gastric bypass to pursue a staged procedure initially with sleeve gastrectomy and subsequent conversion to Chela-en-Y gastric bypass.

## 2021-10-28 NOTE — PROGRESS NOTES
Ekaterina Sorensen is a 31 y.o. female who presents today with   Chief Complaint   Patient presents with    Morbid Obesity     Pt presents today to discuss surgical options                Body mass index is 77.39 kg/m². 1. Have you been to the ER, urgent care clinic since your last visit? Hospitalized since your last visit? No    2. Have you seen or consulted any other health care providers outside of the [address] [place] [address] since your last visit? Include any pap smears or colon screening.  No

## 2021-10-28 NOTE — LETTER
NOTIFICATION RETURN TO WORK / SCHOOL    10/28/2021 11:42 AM    Ms. Mary Jo Lantigua  [address] [address]      To Whom It May Concern:    Mary Jo Lantigua is currently under the care of [address]. She will return to work on 10/29/2021 with no restrictions. If there are questions or concerns please have the patient contact our office.         Sincerely,      Maximilian Carter, DO

## 2021-11-15 ENCOUNTER — DOCUMENTATION ONLY (OUTPATIENT)
Dept: BARIATRICS/WEIGHT MGMT | Age: 36
End: 2021-11-15

## 2021-11-16 ENCOUNTER — HOSPITAL ENCOUNTER (OUTPATIENT)
Dept: BARIATRICS/WEIGHT MGMT | Age: 36
Discharge: HOME | End: 2021-11-16

## 2021-11-16 NOTE — PROGRESS NOTES
CLINICAL NUTRITION PRE-OPERATIVE EDUCATION    Patient's Name: Murray Preston   Age: 31 y.o. YOB: 1985   Sex: female    Education & Materials Provided:   - Soft Protein Diet Shopping List  -  Supplemental Resource Guide: MVI, B12, Calcium Citrate, Vitamin D, Vitamin B1,   and iron recommendations  - Protein Supplement Information  - Fluid Requirements/ No Straws  - No Caffeine or Carbonation   - No alcohol              - No Snacks or No Concentrated Sweets     - Exercising   - Support System at [email or web address] [facility] of Support Group meetings. Support System after surgery includes: x     - Key Diet Principles            - Addressed Current Habits/Changes to Make   - Patient has been educated on the liquid diet to begin 1 week prior to surgery. Patient understands the transition of the diet. Attendance of support group: Yes    Summary:  Patient has completed the required amount of visits with the Registered Dietitian. During these nutrition visits, we focused on dietary changes, behavior changes, and the importance of establishing an exercise routine. The diet protocol that patient was prescribed emphasized on low carbohydrates (less than 100 grams per day prior to surgery and 60-80 grams of protein per day). At today's session, patient was educated on the post-op diet protocol. Patient understands the importance of keeping total fat and sugar less than 3 grams per serving. Patient is aware of the transition of the diet stages and is aware that they will be on clear liquids for 7days, followed by soft protein for 5 weeks. Patient understands the body needs ~ 60-70 grams of protein per day. During the liquid phase, patient will need 60 grams of protein from shakes. Once eating soft protein, patient will only need 1 shake per day. Patient is aware of the importance of the vitamins and protein drinks. We spent a lot of time talking about the vitamins.   Patient understands the importance of being compliant with the diet protocol and the complications and risks that can occur if they are non-compliant with the nutritional protocol and non-compliant with the vitamins. Patient has also been educated on the pre-op liquid diet for 1 week. Patient understands that failure to comply in pre-op liquid diet could result in surgery being canceled. Patient's 6 week post-op nutrition appointment has been scheduled. At this 6 week visit, RD will assess how patient is tolerating soft protein and advance to vegetables, if tolerating soft protein without difficulty. Patient will also see RD again at 9 months post-op. This visit will assess patient's compliance with current protocol, including diet, vitamins, protein shakes, and exercise. Post-op diet guidelines will be reinforced. RD is available for questions and to meet with patient outside of the 6 week and 9 month post-op visit. Ok to proceed.      Candidate for surgery: Yes  Re-evaluation Date:     Roe Shell Jacobo 87 RD  11/15/2021

## 2021-12-01 ENCOUNTER — HOSPITAL ENCOUNTER (OUTPATIENT)
Dept: PREADMISSION TESTING | Age: 36
Discharge: HOME | End: 2021-12-01
Payer: MEDICAID

## 2021-12-01 DIAGNOSIS — Z01.818 PRE-OP EVALUATION: ICD-10-CM

## 2021-12-01 LAB — SARS-COV-2, NAA: NOT DETECTED

## 2021-12-01 PROCEDURE — U0003 INFECTIOUS AGENT DETECTION BY NUCLEIC ACID (DNA OR RNA); SEVERE ACUTE RESPIRATORY SYNDROME CORONAVIRUS 2 (SARS-COV-2) (CORONAVIRUS DISEASE [COVID-19]), AMPLIFIED PROBE TECHNIQUE, MAKING USE OF HIGH THROUGHPUT TECHNOLOGIES AS DESCRIBED BY CMS-2020-01-R: HCPCS

## 2021-12-02 RX ORDER — ACETAMINOPHEN 500 MG
TABLET ORAL
COMMUNITY

## 2021-12-03 ENCOUNTER — ANESTHESIA EVENT (OUTPATIENT)
Dept: SURGERY | Age: 36
DRG: 403 | End: 2021-12-03
Payer: MEDICAID

## 2021-12-06 ENCOUNTER — ANESTHESIA (OUTPATIENT)
Dept: SURGERY | Age: 36
DRG: 403 | End: 2021-12-06
Payer: MEDICAID

## 2021-12-06 ENCOUNTER — HOSPITAL ENCOUNTER (INPATIENT)
Age: 36
LOS: 1 days | Discharge: HOME | DRG: 403 | End: 2021-12-07
Attending: SURGERY | Admitting: SURGERY
Payer: MEDICAID

## 2021-12-06 DIAGNOSIS — G89.18 POST-OP PAIN: Primary | ICD-10-CM

## 2021-12-06 DIAGNOSIS — E66.01 MORBID OBESITY WITH BMI OF 70 AND OVER, ADULT (HCC): ICD-10-CM

## 2021-12-06 LAB
AMPHET UR QL SCN: NEGATIVE
BARBITURATES UR QL SCN: NEGATIVE
BENZODIAZ UR QL: NEGATIVE
CANNABINOIDS UR QL SCN: NEGATIVE
COCAINE UR QL SCN: NEGATIVE
GLUCOSE BLD STRIP.AUTO-MCNC: 129 MG/DL (ref 70–110)
HCG UR QL: NEGATIVE
HCG UR QL: NEGATIVE
HDSCOM,HDSCOM: NORMAL
METHADONE UR QL: NEGATIVE
OPIATES UR QL: NEGATIVE
PCP UR QL: NEGATIVE

## 2021-12-06 PROCEDURE — 0FB24ZX EXCISION OF LEFT LOBE LIVER, PERCUTANEOUS ENDOSCOPIC APPROACH, DIAGNOSTIC: ICD-10-PCS | Performed by: SURGERY

## 2021-12-06 PROCEDURE — 88307 TISSUE EXAM BY PATHOLOGIST: CPT

## 2021-12-06 PROCEDURE — 47379 UNLISTED LAPS PX LIVER: CPT | Performed by: SURGERY

## 2021-12-06 PROCEDURE — 74011250636 HC RX REV CODE- 250/636: Performed by: NURSE ANESTHETIST, CERTIFIED REGISTERED

## 2021-12-06 PROCEDURE — 2709999900 HC NON-CHARGEABLE SUPPLY

## 2021-12-06 PROCEDURE — 77030018836 HC SOL IRR NACL ICUM -A: Performed by: SURGERY

## 2021-12-06 PROCEDURE — 81025 URINE PREGNANCY TEST: CPT

## 2021-12-06 PROCEDURE — 77030040922 HC BLNKT HYPOTHRM STRY -A: Performed by: SURGERY

## 2021-12-06 PROCEDURE — 77030011808 HC STPLR ENDOSCOPIC J&J -D: Performed by: SURGERY

## 2021-12-06 PROCEDURE — 65270000029 HC RM PRIVATE

## 2021-12-06 PROCEDURE — 74011000250 HC RX REV CODE- 250: Performed by: NURSE ANESTHETIST, CERTIFIED REGISTERED

## 2021-12-06 PROCEDURE — 77030008437 HC REINF STRP REINF SEMGD WLGO -C: Performed by: SURGERY

## 2021-12-06 PROCEDURE — 74011250636 HC RX REV CODE- 250/636: Performed by: SURGERY

## 2021-12-06 PROCEDURE — 76010000133 HC OR TIME 3 TO 3.5 HR: Performed by: SURGERY

## 2021-12-06 PROCEDURE — 88313 SPECIAL STAINS GROUP 2: CPT

## 2021-12-06 PROCEDURE — 77030036732 HC RELD STPLR VASC J&J -F: Performed by: SURGERY

## 2021-12-06 PROCEDURE — 77030040361 HC SLV COMPR DVT MDII -B: Performed by: SURGERY

## 2021-12-06 PROCEDURE — 77030013079 HC BLNKT BAIR HGGR 3M -A: Performed by: ANESTHESIOLOGY

## 2021-12-06 PROCEDURE — 77030008606 HC TRCR ENDOSC KII AMR -B: Performed by: SURGERY

## 2021-12-06 PROCEDURE — C9290 INJ, BUPIVACAINE LIPOSOME: HCPCS | Performed by: SURGERY

## 2021-12-06 PROCEDURE — 00797 ANES IPER UPR ABD GSTR PX MO: CPT | Performed by: NURSE ANESTHETIST, CERTIFIED REGISTERED

## 2021-12-06 PROCEDURE — 2709999900 HC NON-CHARGEABLE SUPPLY: Performed by: SURGERY

## 2021-12-06 PROCEDURE — 0D164ZA BYPASS STOMACH TO JEJUNUM, PERCUTANEOUS ENDOSCOPIC APPROACH: ICD-10-PCS | Performed by: SURGERY

## 2021-12-06 PROCEDURE — 82962 GLUCOSE BLOOD TEST: CPT

## 2021-12-06 PROCEDURE — 74011000258 HC RX REV CODE- 258: Performed by: SURGERY

## 2021-12-06 PROCEDURE — 77030031139 HC SUT VCRL2 J&J -A: Performed by: SURGERY

## 2021-12-06 PROCEDURE — 77030002996 HC SUT SLK J&J -A: Performed by: SURGERY

## 2021-12-06 PROCEDURE — 77030026438 HC STYL ET INTUB CARD -A: Performed by: ANESTHESIOLOGY

## 2021-12-06 PROCEDURE — 00797 ANES IPER UPR ABD GSTR PX MO: CPT | Performed by: ANESTHESIOLOGY

## 2021-12-06 PROCEDURE — 77030008598 HC TRCR ENDOSC BLDLS J&J -B: Performed by: SURGERY

## 2021-12-06 PROCEDURE — 76210000006 HC OR PH I REC 0.5 TO 1 HR: Performed by: SURGERY

## 2021-12-06 PROCEDURE — 77030033639 HC SHR ENDO COAG HARM 36 J&J -E: Performed by: SURGERY

## 2021-12-06 PROCEDURE — 77030009932 HC PRB FT CTRL J&J -B: Performed by: SURGERY

## 2021-12-06 PROCEDURE — 77030008603 HC TRCR ENDOSC EPATH J&J -C: Performed by: SURGERY

## 2021-12-06 PROCEDURE — 77030027876 HC STPLR ENDOSC FLX PWR J&J -G1: Performed by: SURGERY

## 2021-12-06 PROCEDURE — 77030008683 HC TU ET CUF COVD -A: Performed by: ANESTHESIOLOGY

## 2021-12-06 PROCEDURE — 77030010031 HC SCIS ENDOSC MPLR J&J -C: Performed by: SURGERY

## 2021-12-06 PROCEDURE — 80307 DRUG TEST PRSMV CHEM ANLYZR: CPT

## 2021-12-06 PROCEDURE — 74011250637 HC RX REV CODE- 250/637: Performed by: SURGERY

## 2021-12-06 PROCEDURE — 77030002933 HC SUT MCRYL J&J -A: Performed by: SURGERY

## 2021-12-06 PROCEDURE — 77030027138 HC INCENT SPIROMETER -A

## 2021-12-06 PROCEDURE — 77030008756 HC TU IRR SUC STRY -B: Performed by: SURGERY

## 2021-12-06 PROCEDURE — 43645 LAP GASTR BYPASS INCL SMLL I: CPT | Performed by: SURGERY

## 2021-12-06 PROCEDURE — 76060000037 HC ANESTHESIA 3 TO 3.5 HR: Performed by: SURGERY

## 2021-12-06 PROCEDURE — 74011000250 HC RX REV CODE- 250: Performed by: SURGERY

## 2021-12-06 PROCEDURE — 77030016151 HC PROTCTR LNS DFOG COVD -B: Performed by: SURGERY

## 2021-12-06 PROCEDURE — 77030009968 HC RELD STPLR ENDOSC J&J -D: Performed by: SURGERY

## 2021-12-06 RX ORDER — NEOSTIGMINE METHYLSULFATE 1 MG/ML
INJECTION, SOLUTION INTRAVENOUS AS NEEDED
Status: DISCONTINUED | OUTPATIENT
Start: 2021-12-06 | End: 2021-12-06 | Stop reason: HOSPADM

## 2021-12-06 RX ORDER — ACETAMINOPHEN 325 MG/1
650 TABLET ORAL EVERY 6 HOURS
Status: DISCONTINUED | OUTPATIENT
Start: 2021-12-06 | End: 2021-12-07 | Stop reason: HOSPADM

## 2021-12-06 RX ORDER — METOPROLOL TARTRATE 5 MG/5ML
INJECTION INTRAVENOUS AS NEEDED
Status: DISCONTINUED | OUTPATIENT
Start: 2021-12-06 | End: 2021-12-06 | Stop reason: HOSPADM

## 2021-12-06 RX ORDER — ENOXAPARIN SODIUM 100 MG/ML
40 INJECTION SUBCUTANEOUS EVERY 24 HOURS
Status: DISCONTINUED | OUTPATIENT
Start: 2021-12-07 | End: 2021-12-07 | Stop reason: HOSPADM

## 2021-12-06 RX ORDER — NALOXONE HYDROCHLORIDE 0.4 MG/ML
0.4 INJECTION, SOLUTION INTRAMUSCULAR; INTRAVENOUS; SUBCUTANEOUS AS NEEDED
Status: DISCONTINUED | OUTPATIENT
Start: 2021-12-06 | End: 2021-12-07 | Stop reason: HOSPADM

## 2021-12-06 RX ORDER — ACETAMINOPHEN 650 MG/1
SUPPOSITORY RECTAL AS NEEDED
Status: DISCONTINUED | OUTPATIENT
Start: 2021-12-06 | End: 2021-12-06 | Stop reason: HOSPADM

## 2021-12-06 RX ORDER — KETOROLAC TROMETHAMINE 30 MG/ML
15 INJECTION, SOLUTION INTRAMUSCULAR; INTRAVENOUS
Status: DISCONTINUED | OUTPATIENT
Start: 2021-12-06 | End: 2021-12-07 | Stop reason: HOSPADM

## 2021-12-06 RX ORDER — PROPOFOL 10 MG/ML
INJECTION, EMULSION INTRAVENOUS AS NEEDED
Status: DISCONTINUED | OUTPATIENT
Start: 2021-12-06 | End: 2021-12-06 | Stop reason: HOSPADM

## 2021-12-06 RX ORDER — GLYCOPYRROLATE 0.2 MG/ML
INJECTION INTRAMUSCULAR; INTRAVENOUS AS NEEDED
Status: DISCONTINUED | OUTPATIENT
Start: 2021-12-06 | End: 2021-12-06 | Stop reason: HOSPADM

## 2021-12-06 RX ORDER — NALOXONE HYDROCHLORIDE 0.4 MG/ML
0.1 INJECTION, SOLUTION INTRAMUSCULAR; INTRAVENOUS; SUBCUTANEOUS AS NEEDED
Status: DISCONTINUED | OUTPATIENT
Start: 2021-12-06 | End: 2021-12-07 | Stop reason: HOSPADM

## 2021-12-06 RX ORDER — SODIUM CHLORIDE, SODIUM LACTATE, POTASSIUM CHLORIDE, CALCIUM CHLORIDE 600; 310; 30; 20 MG/100ML; MG/100ML; MG/100ML; MG/100ML
25 INJECTION, SOLUTION INTRAVENOUS CONTINUOUS
Status: DISCONTINUED | OUTPATIENT
Start: 2021-12-06 | End: 2021-12-06 | Stop reason: HOSPADM

## 2021-12-06 RX ORDER — SODIUM CHLORIDE, SODIUM LACTATE, POTASSIUM CHLORIDE, CALCIUM CHLORIDE 600; 310; 30; 20 MG/100ML; MG/100ML; MG/100ML; MG/100ML
150 INJECTION, SOLUTION INTRAVENOUS CONTINUOUS
Status: DISCONTINUED | OUTPATIENT
Start: 2021-12-06 | End: 2021-12-07 | Stop reason: HOSPADM

## 2021-12-06 RX ORDER — ONDANSETRON 2 MG/ML
4 INJECTION INTRAMUSCULAR; INTRAVENOUS
Status: DISCONTINUED | OUTPATIENT
Start: 2021-12-06 | End: 2021-12-07 | Stop reason: HOSPADM

## 2021-12-06 RX ORDER — ENOXAPARIN SODIUM 100 MG/ML
40 INJECTION SUBCUTANEOUS ONCE
Status: COMPLETED | OUTPATIENT
Start: 2021-12-06 | End: 2021-12-06

## 2021-12-06 RX ORDER — DIPHENHYDRAMINE HYDROCHLORIDE 50 MG/ML
25 INJECTION, SOLUTION INTRAMUSCULAR; INTRAVENOUS
Status: DISCONTINUED | OUTPATIENT
Start: 2021-12-06 | End: 2021-12-07 | Stop reason: HOSPADM

## 2021-12-06 RX ORDER — ROCURONIUM BROMIDE 10 MG/ML
INJECTION, SOLUTION INTRAVENOUS AS NEEDED
Status: DISCONTINUED | OUTPATIENT
Start: 2021-12-06 | End: 2021-12-06 | Stop reason: HOSPADM

## 2021-12-06 RX ORDER — LIDOCAINE HYDROCHLORIDE 10 MG/ML
0.1 INJECTION, SOLUTION EPIDURAL; INFILTRATION; INTRACAUDAL; PERINEURAL AS NEEDED
Status: DISCONTINUED | OUTPATIENT
Start: 2021-12-06 | End: 2021-12-06 | Stop reason: HOSPADM

## 2021-12-06 RX ORDER — DEXAMETHASONE SODIUM PHOSPHATE 4 MG/ML
INJECTION, SOLUTION INTRA-ARTICULAR; INTRALESIONAL; INTRAMUSCULAR; INTRAVENOUS; SOFT TISSUE AS NEEDED
Status: DISCONTINUED | OUTPATIENT
Start: 2021-12-06 | End: 2021-12-06 | Stop reason: HOSPADM

## 2021-12-06 RX ORDER — SODIUM CHLORIDE 0.9 % (FLUSH) 0.9 %
5-40 SYRINGE (ML) INJECTION AS NEEDED
Status: DISCONTINUED | OUTPATIENT
Start: 2021-12-06 | End: 2021-12-06 | Stop reason: HOSPADM

## 2021-12-06 RX ORDER — ONDANSETRON 2 MG/ML
INJECTION INTRAMUSCULAR; INTRAVENOUS AS NEEDED
Status: DISCONTINUED | OUTPATIENT
Start: 2021-12-06 | End: 2021-12-06 | Stop reason: HOSPADM

## 2021-12-06 RX ORDER — ALBUTEROL SULFATE 90 UG/1
2 AEROSOL, METERED RESPIRATORY (INHALATION)
Status: DISCONTINUED | OUTPATIENT
Start: 2021-12-06 | End: 2021-12-07 | Stop reason: CLARIF

## 2021-12-06 RX ORDER — FENTANYL CITRATE 50 UG/ML
INJECTION, SOLUTION INTRAMUSCULAR; INTRAVENOUS AS NEEDED
Status: DISCONTINUED | OUTPATIENT
Start: 2021-12-06 | End: 2021-12-06 | Stop reason: HOSPADM

## 2021-12-06 RX ORDER — MIDAZOLAM HYDROCHLORIDE 1 MG/ML
INJECTION, SOLUTION INTRAMUSCULAR; INTRAVENOUS AS NEEDED
Status: DISCONTINUED | OUTPATIENT
Start: 2021-12-06 | End: 2021-12-06 | Stop reason: HOSPADM

## 2021-12-06 RX ORDER — HYDROMORPHONE HYDROCHLORIDE 1 MG/ML
0.5 INJECTION, SOLUTION INTRAMUSCULAR; INTRAVENOUS; SUBCUTANEOUS
Status: DISCONTINUED | OUTPATIENT
Start: 2021-12-06 | End: 2021-12-07 | Stop reason: HOSPADM

## 2021-12-06 RX ORDER — ACETAMINOPHEN 650 MG/1
650 SUPPOSITORY RECTAL ONCE
Status: ACTIVE | OUTPATIENT
Start: 2021-12-06 | End: 2021-12-07

## 2021-12-06 RX ORDER — HYDROMORPHONE HYDROCHLORIDE 1 MG/ML
1 INJECTION, SOLUTION INTRAMUSCULAR; INTRAVENOUS; SUBCUTANEOUS
Status: DISCONTINUED | OUTPATIENT
Start: 2021-12-06 | End: 2021-12-07 | Stop reason: HOSPADM

## 2021-12-06 RX ORDER — HYDROMORPHONE HYDROCHLORIDE 2 MG/ML
INJECTION, SOLUTION INTRAMUSCULAR; INTRAVENOUS; SUBCUTANEOUS AS NEEDED
Status: DISCONTINUED | OUTPATIENT
Start: 2021-12-06 | End: 2021-12-06 | Stop reason: HOSPADM

## 2021-12-06 RX ORDER — SODIUM CHLORIDE 0.9 % (FLUSH) 0.9 %
5-40 SYRINGE (ML) INJECTION AS NEEDED
Status: DISCONTINUED | OUTPATIENT
Start: 2021-12-06 | End: 2021-12-07 | Stop reason: HOSPADM

## 2021-12-06 RX ORDER — ONDANSETRON 2 MG/ML
4 INJECTION INTRAMUSCULAR; INTRAVENOUS ONCE
Status: COMPLETED | OUTPATIENT
Start: 2021-12-06 | End: 2021-12-06

## 2021-12-06 RX ORDER — FAMOTIDINE 20 MG/1
20 TABLET, FILM COATED ORAL ONCE
Status: COMPLETED | OUTPATIENT
Start: 2021-12-06 | End: 2021-12-06

## 2021-12-06 RX ORDER — HYOSCYAMINE SULFATE 0.12 MG/1
0.12 TABLET SUBLINGUAL
Status: DISCONTINUED | OUTPATIENT
Start: 2021-12-06 | End: 2021-12-07 | Stop reason: HOSPADM

## 2021-12-06 RX ORDER — SODIUM CHLORIDE, SODIUM LACTATE, POTASSIUM CHLORIDE, CALCIUM CHLORIDE 600; 310; 30; 20 MG/100ML; MG/100ML; MG/100ML; MG/100ML
150 INJECTION, SOLUTION INTRAVENOUS CONTINUOUS
Status: DISCONTINUED | OUTPATIENT
Start: 2021-12-06 | End: 2021-12-06 | Stop reason: HOSPADM

## 2021-12-06 RX ORDER — OXYCODONE HYDROCHLORIDE 5 MG/1
5 TABLET ORAL
Status: DISCONTINUED | OUTPATIENT
Start: 2021-12-06 | End: 2021-12-07 | Stop reason: HOSPADM

## 2021-12-06 RX ORDER — SODIUM CHLORIDE 0.9 % (FLUSH) 0.9 %
5-40 SYRINGE (ML) INJECTION EVERY 8 HOURS
Status: DISCONTINUED | OUTPATIENT
Start: 2021-12-06 | End: 2021-12-07 | Stop reason: HOSPADM

## 2021-12-06 RX ORDER — DIPHENHYDRAMINE HYDROCHLORIDE 50 MG/ML
12.5 INJECTION, SOLUTION INTRAMUSCULAR; INTRAVENOUS
Status: DISCONTINUED | OUTPATIENT
Start: 2021-12-06 | End: 2021-12-07 | Stop reason: HOSPADM

## 2021-12-06 RX ORDER — HYDROMORPHONE HYDROCHLORIDE 1 MG/ML
0.2 INJECTION, SOLUTION INTRAMUSCULAR; INTRAVENOUS; SUBCUTANEOUS AS NEEDED
Status: DISCONTINUED | OUTPATIENT
Start: 2021-12-06 | End: 2021-12-07 | Stop reason: HOSPADM

## 2021-12-06 RX ORDER — SODIUM CHLORIDE 0.9 % (FLUSH) 0.9 %
5-40 SYRINGE (ML) INJECTION EVERY 8 HOURS
Status: DISCONTINUED | OUTPATIENT
Start: 2021-12-06 | End: 2021-12-06 | Stop reason: HOSPADM

## 2021-12-06 RX ORDER — SUCCINYLCHOLINE CHLORIDE 20 MG/ML
INJECTION INTRAMUSCULAR; INTRAVENOUS AS NEEDED
Status: DISCONTINUED | OUTPATIENT
Start: 2021-12-06 | End: 2021-12-06 | Stop reason: HOSPADM

## 2021-12-06 RX ORDER — SODIUM CHLORIDE 9 MG/ML
INJECTION, SOLUTION INTRAVENOUS
Status: COMPLETED | OUTPATIENT
Start: 2021-12-06 | End: 2021-12-06

## 2021-12-06 RX ORDER — SODIUM CHLORIDE, SODIUM LACTATE, POTASSIUM CHLORIDE, CALCIUM CHLORIDE 600; 310; 30; 20 MG/100ML; MG/100ML; MG/100ML; MG/100ML
50 INJECTION, SOLUTION INTRAVENOUS CONTINUOUS
Status: DISCONTINUED | OUTPATIENT
Start: 2021-12-06 | End: 2021-12-07 | Stop reason: HOSPADM

## 2021-12-06 RX ADMIN — HYDROMORPHONE HYDROCHLORIDE 0.5 MG: 2 INJECTION, SOLUTION INTRAMUSCULAR; INTRAVENOUS; SUBCUTANEOUS at 12:23

## 2021-12-06 RX ADMIN — PROPOFOL 200 MG: 10 INJECTION, EMULSION INTRAVENOUS at 11:29

## 2021-12-06 RX ADMIN — SUCCINYLCHOLINE CHLORIDE 180 MG: 20 INJECTION, SOLUTION INTRAMUSCULAR; INTRAVENOUS at 11:29

## 2021-12-06 RX ADMIN — ROCURONIUM BROMIDE 10 MG: 50 INJECTION INTRAVENOUS at 13:20

## 2021-12-06 RX ADMIN — Medication 10 ML: at 23:35

## 2021-12-06 RX ADMIN — HYOSCYAMINE SULFATE 0.12 MG: 0.12 TABLET ORAL; SUBLINGUAL at 19:25

## 2021-12-06 RX ADMIN — PROPOFOL 50 MG: 10 INJECTION, EMULSION INTRAVENOUS at 14:34

## 2021-12-06 RX ADMIN — ONDANSETRON 4 MG: 2 INJECTION INTRAMUSCULAR; INTRAVENOUS at 14:34

## 2021-12-06 RX ADMIN — FAMOTIDINE 20 MG: 20 TABLET ORAL at 19:25

## 2021-12-06 RX ADMIN — HYDROMORPHONE HYDROCHLORIDE 0.5 MG: 2 INJECTION, SOLUTION INTRAMUSCULAR; INTRAVENOUS; SUBCUTANEOUS at 14:39

## 2021-12-06 RX ADMIN — METOPROLOL TARTRATE 5 MG: 5 INJECTION, SOLUTION INTRAVENOUS at 13:47

## 2021-12-06 RX ADMIN — SODIUM CHLORIDE, SODIUM LACTATE, POTASSIUM CHLORIDE, AND CALCIUM CHLORIDE 150 ML/HR: 600; 310; 30; 20 INJECTION, SOLUTION INTRAVENOUS at 18:53

## 2021-12-06 RX ADMIN — GLYCOPYRROLATE 0.2 MG: 0.2 INJECTION INTRAMUSCULAR; INTRAVENOUS at 11:38

## 2021-12-06 RX ADMIN — Medication 3 MG: at 14:34

## 2021-12-06 RX ADMIN — GLYCOPYRROLATE 0.4 MG: 0.2 INJECTION INTRAMUSCULAR; INTRAVENOUS at 14:34

## 2021-12-06 RX ADMIN — SODIUM CHLORIDE, SODIUM LACTATE, POTASSIUM CHLORIDE, AND CALCIUM CHLORIDE: 600; 310; 30; 20 INJECTION, SOLUTION INTRAVENOUS at 14:39

## 2021-12-06 RX ADMIN — HYDROMORPHONE HYDROCHLORIDE 1 MG: 2 INJECTION, SOLUTION INTRAMUSCULAR; INTRAVENOUS; SUBCUTANEOUS at 13:20

## 2021-12-06 RX ADMIN — WATER 3 G: 1 INJECTION INTRAMUSCULAR; INTRAVENOUS; SUBCUTANEOUS at 11:45

## 2021-12-06 RX ADMIN — DEXAMETHASONE SODIUM PHOSPHATE 4 MG: 4 INJECTION, SOLUTION INTRAMUSCULAR; INTRAVENOUS at 12:04

## 2021-12-06 RX ADMIN — ENOXAPARIN SODIUM 40 MG: 100 INJECTION SUBCUTANEOUS at 10:20

## 2021-12-06 RX ADMIN — FENTANYL CITRATE 100 MCG: 50 INJECTION, SOLUTION INTRAMUSCULAR; INTRAVENOUS at 11:28

## 2021-12-06 RX ADMIN — ONDANSETRON 4 MG: 2 INJECTION INTRAMUSCULAR; INTRAVENOUS at 19:25

## 2021-12-06 RX ADMIN — ACETAMINOPHEN 650 MG: 325 TABLET ORAL at 23:30

## 2021-12-06 RX ADMIN — METOPROLOL TARTRATE 5 MG: 5 INJECTION, SOLUTION INTRAVENOUS at 12:14

## 2021-12-06 RX ADMIN — FAMOTIDINE 20 MG: 10 INJECTION INTRAVENOUS at 10:30

## 2021-12-06 RX ADMIN — ROCURONIUM BROMIDE 50 MG: 50 INJECTION INTRAVENOUS at 11:40

## 2021-12-06 RX ADMIN — MIDAZOLAM HYDROCHLORIDE 2 MG: 2 INJECTION, SOLUTION INTRAMUSCULAR; INTRAVENOUS at 11:21

## 2021-12-06 RX ADMIN — SODIUM CHLORIDE, SODIUM LACTATE, POTASSIUM CHLORIDE, AND CALCIUM CHLORIDE 25 ML/HR: 600; 310; 30; 20 INJECTION, SOLUTION INTRAVENOUS at 10:49

## 2021-12-06 NOTE — ANESTHESIA POSTPROCEDURE EVALUATION
Procedure(s):  LAPAROSCOPIC SIRENA-EN-Y GASTRIC BYPASS WITH LIVER WEDGE BIOPSY, POSSIBLE HIATAL HERNIA REPAIR. general    Anesthesia Post Evaluation      Multimodal analgesia: multimodal analgesia used between 6 hours prior to anesthesia start to PACU discharge  Patient location during evaluation: bedside  Patient participation: complete - patient participated  Level of consciousness: awake  Pain management: adequate  Airway patency: patent  Anesthetic complications: no  Cardiovascular status: stable  Respiratory status: acceptable  Hydration status: acceptable  Post anesthesia nausea and vomiting:  controlled      INITIAL Post-op Vital signs:   Vitals Value Taken Time   /76 12/06/21 1705   Temp 36.5 °C (97.7 °F) 12/06/21 1458   Pulse 93 12/06/21 1707   Resp 22 12/06/21 1707   SpO2 96 % 12/06/21 1707   Vitals shown include unvalidated device data.

## 2021-12-06 NOTE — PERIOP NOTES
TRANSFER - OUT REPORT:    Telephone report given to Alina(name) on Cindy Palmer  being transferred to [facility](unit) for routine post - op       Report consisted of patients Situation, Background, Assessment and   Recommendations(SBAR). Information from the following report(s) SBAR, OR Summary and MAR was reviewed with the receiving nurse. Lines:   Peripheral IV 12/06/21 Anterior; Left Forearm (Active)   Site Assessment Clean, dry, & intact 12/06/21 1455   Phlebitis Assessment 0 12/06/21 1455   Infiltration Assessment 0 12/06/21 1455   Dressing Status Clean, dry, & intact 12/06/21 1455   Dressing Type Tape; Transparent 12/06/21 1455   Hub Color/Line Status Pink; Infusing 12/06/21 1455        Opportunity for questions and clarification was provided.       Patient transported with:   O2 @ 3 liters  Tech

## 2021-12-06 NOTE — PROGRESS NOTES
Problem: Falls - Risk of  Goal: *Absence of Falls  Description: Document Kyung Odonnell Fall Risk and appropriate interventions in the flowsheet.   Outcome: Progressing Towards Goal  Note: Fall Risk Interventions:                                Problem: Patient Education: Go to Patient Education Activity  Goal: Patient/Family Education  Outcome: Progressing Towards Goal

## 2021-12-06 NOTE — BRIEF OP NOTE
Brief Postoperative Note    Patient: Cindy Palmer  YOB: 1985  MRN: [id number]    Date of Procedure: 12/6/2021     Pre-Op Diagnosis: 1. Super, super, super obesity with a body mass index of 78  2. Hepatomegaly/hepatic steatosis    Post-Op Diagnosis:    Procedure(s):  1. LAPAROSCOPIC SIRENA-EN-Y GASTRIC BYPASS  2.   Laparoscopic left hepatic lobe wedge biopsy    Surgeon(s):  DO Nikolai Coelho MD    Surgical Assistant: Surg Asst-1: Karla Llanos    Anesthesia: General     Estimated Blood Loss (mL): Minimal    Complications: None    Specimens:   ID Type Source Tests Collected by Time Destination   1 : liver biopsy Preservative Liver  Freddie Harper DO 12/6/2021 1432 Pathology        Implants: * No implants in log *    Drains: * No LDAs found *    Findings: Exceptionally thick abdominal wall, massive hepatomegaly with morphologic appearance of hepatic steatosis    Electronically Signed by Catherine Chakraborty DO on 12/6/2021 at 3:02 PM

## 2021-12-06 NOTE — ROUTINE PROCESS
TRANSFER - IN REPORT:    Verbal report received from Cande RN(name) on [employer]  being received from [facility]) for routine post - op      Report consisted of patients Situation, Background, Assessment and   Recommendations(SBAR). Information from the following report(s) SBAR, Kardex, OR Summary, Intake/Output and Recent Results was reviewed with the receiving nurse. Opportunity for questions and clarification was provided. Assessment completed upon patients arrival to unit and care assumed.            3

## 2021-12-06 NOTE — H&P
Office Visit    10/28/2021  [facility]   Palmer Devries DO    General Surgery  Morbid obesity with body mass index of 70 and over in adult [facility])    Dx  Morbid Obesity ; Referred by Carina Marie MD    Reason for Visit       Progress Notes  Palmer Devries DO (Physician) Sylvie Bernstein General Surgery     Preop History and Physical Exam:     Mauricio Pedraza is a 31 y.o. black female initially evaluated in this office April 29, 2021 for discussion surgical options over the definitive management of her super, super, super obesity. The patient at that time weighed 509 pounds on a 5 foot 8 inch frame with a body mass index of 77 with obesity related comorbidities of prediabetes, reactive airway disease, clinical obstructive sleep apnea, weight related arthropathy. Patient after discussing surgical options elected pursue laparoscopic tensely open Chela-en-Y gastric bypass to achieve definitive durable weight loss on a personal level expected resolution of obesity related comorbidities. Patient presents today in follow-up after completing all bariatric surgery multidisciplinary programmatic requirements necessary prior to the pursuit of surgery for review the diagnostic evaluation and surgical scheduling noting no new medical or surgical history. Patient currently weighs 509 pounds on a 5 foot 8 inch frame with a body mass index of 77 with obesity related comorbidities of prediabetes, reactive airway disease, clinical obstructive sleep apnea and weight related arthropathy.   Ideal body weight 142 pounds, excess body weight 367 pounds, estimated postsurgical weight loss based on 8% loss of excess body weight 294 pounds, postsurgical goal weight 216 pounds          Past Medical History:   Diagnosis Date    Abscess and cellulitis       recurrent since age 17    Abscess and cellulitis      Asthma       since she was a child    Headache, common migraine                 Past Surgical History:   Procedure Laterality Date    HX TUBAL LIGATION   2019                Current Outpatient Medications   Medication Sig Dispense Refill    hydroCHLOROthiazide (HYDRODIURIL) 25 mg tablet Take 1 Tablet by mouth daily. 90 Tablet 1    albuterol (PROVENTIL HFA, VENTOLIN HFA, PROAIR HFA) 90 mcg/actuation inhaler Take 2 Puffs by inhalation every six (6) hours as needed for Wheezing. 1 Inhaler 3    diphenhydrAMINE (Benadryl Allergy) 25 mg tablet Take 25 mg by mouth every six (6) hours as needed.        ibuprofen (MOTRIN) 800 mg tablet Take 1 Tab by mouth every eight (8) hours as needed for Pain. (Patient not taking: Reported on 6/29/2021) 60 Tab 3               Allergies   Allergen Reactions    Bactrim [Sulfamethoprim Ds] Other (comments)       Blisters-Abdirahman Gino's    Iodine Hives and Swelling       Topical only    Sulfamethoxazole-Trimethoprim Unknown (comments)       Pt states inside of her mouth feels raw when she takes Bactrim.         Social History            Tobacco Use    Smoking status: Never Smoker    Smokeless tobacco: Never Used   Substance Use Topics    Alcohol use: Yes       Alcohol/week: 0.0 standard drinks    Drug use:  Yes       Types: Marijuana               Family History   Problem Relation Age of Onset    Bleeding Prob Mother      Cancer Maternal Grandmother      Stroke Maternal Grandmother           Review of Systems:  Positive in BOLD     CONST: Fever, weight loss, fatigue or chills  GI: Nausea, vomiting, abdominal pain, change in bowel habits, hematochezia, melena, and GERD   INTEG: Dermatitis, abnormal moles  HEENT: Recent changes in vision, vertigo, epistaxis, dysphagia and hoarseness  CV: Chest pain, palpitations, HTN, edema and varicosities  RESP: Cough, shortness of breath, wheezing, hemoptysis, snoring and reactive airway disease  : Hematuria, dysuria, frequency, urgency, nocturia and stress urinary incontinence   MS: Weakness, joint pain and arthritis  ENDO: Diabetes, thyroid disease, polyuria, polydipsia, polyphagia, poor wound healing, heat intolerance, cold intolerance  LYMPH/HEME: Anemia, bruising and history of blood transfusions  NEURO: Dizziness, headache, fainting, seizures and stroke  PSYCH: Anxiety and depression     Physical Exam     Visit Vitals  BP (!) 149/85 (BP 1 Location: Left upper arm, BP Patient Position: At rest, BP Cuff Size: Adult)   Pulse (!) 111   Temp 97.8 °F (36.6 °C) (Oral)   Resp 20   Ht 5' 8\" (1.727 m)   Wt (!) 230.9 kg (509 lb)   BMI 77.39 kg/m²            General: Super, super, super obesity with body mass index of [age]-year-old black female in no acute distress  Head: Normocephalic, atraumatic  Mouth: Clear, no overt lesions, oral mucosa pink and moist  Cardio: Regular rate and rhythm, no murmurs, clicks, gallops, or rubs. No edema or varicosities. Abdomen: Obese, soft, nontender, nondistended, normoactive bowel sounds, no hernias, no hepatosplenomegaly,   Psych: Alert and oriented to person, place, and time.     Session 14 2021 CBC, CMP, cholesterol panel, TSH, vitamin B1, B12, folate, iron, vitamin D, H. pylori breath test: Vitamin D 9.1, white blood cell count 12.3, glucose 104, hemoglobin A1c 6.0 with otherwise normal laboratory parameters  September 29, 2021 Pap smear: No evidence of malignancy  October 7, 2021 chest x-ray: Normal  October 11, 2021 bariatric nutrition/[place]: Concurring with pursuit of surgery  June 22, 2021 psychology/[place]: Concurrent pursuit of surgery  October 7, 2021 EKG: Normal sinus rhythm with rate of 85, baseline wander otherwise normal     Impression:     Joanna Avila is a 31 y.o. black female with a body mass index of 77 with obesity related comorbidities of prediabetes, reactive airway disease, clinical obstructive sleep apnea and weight related arthropathy w ho would benefit from bariatric surgery.   We've discussed the restrictive and malabsorptive nature of the gastric bypass and compared and contrasted with the sleeve gastrectomy. The patient understands the likelihood of losing approximately 80% of their excess weight in 12 to 18 months. She also understands the risks including but not limited to bleeding, infection, need for reoperation, anastomotic ulcers, leaks and strictures, bowel obstruction secondary to adhesions and internal hernias, DVT, PE, heart attack, stroke, and death. Patient also understands risks of inadequate weight loss, excess weight loss, vitamin insufficiency, protein malnutrition, excess skin, and loss of hair. We have reviewed the components of a successful postoperative course including requirement for a high protein, low carbohydrate diet, 60 oz a day of zero calorie liquids, daily vitamin supplementation, daily exercise, regular follow-up, and participation in support groups. We have reviewed the preoperative liver shrinking clear liquid diet, as well as reviewed any medication changes required while on the clear liquid diet. In addition, the patient understands that all medications to be taken during the first 8 weeks postoperatively can be taken whole as long as the medication is approximately the size of a Disha 325 mg aspirin tablet in size. The patient further understands that it is his/her responsibility to review these and verify with their primary care doctor and pharmacist that all medications are of the appropriate size. We will schedule the patient for attempted laparoscopic potentially open Chela-en-Y gastric bypass with the potential if technically too difficult to pursue gastric bypass to pursue a staged procedure initially with sleeve gastrectomy and subsequent conversion to Chela-en-Y gastric bypass. The examination was reviewed. There is been no interval medical surgical history. The proposed laparoscopic potentially open Chela-en-Y gastric bypass versus sleeve gastrectomy anatomy dictates was again discussed.   The risk benefits of operating versus opted to alternatives potential complications up to including death were again reviewed.   The patient understood the discussion and wishes to proceed    Binh Holley, , FACS

## 2021-12-06 NOTE — ANESTHESIA PREPROCEDURE EVALUATION
Relevant Problems   RESPIRATORY SYSTEM   (+) Asthma      NEUROLOGY   (+) Migraine without aura and without status migrainosus, not intractable      ENDOCRINE   (+) Morbid obesity (HCC)   (+) Morbid obesity with BMI of 70 and over, adult [facility])       Anesthetic History               Review of Systems / Medical History  Patient summary reviewed and pertinent labs reviewed    Pulmonary            Asthma : well controlled       Neuro/Psych   Within defined limits           Cardiovascular    Hypertension: well controlled              Exercise tolerance: <4 METS     GI/Hepatic/Renal  Within defined limits              Endo/Other        Morbid obesity     Other Findings              Physical Exam    Airway  Mallampati: II  TM Distance: 4 - 6 cm  Neck ROM: normal range of motion   Mouth opening: Normal     Cardiovascular  Regular rate and rhythm,  S1 and S2 normal,  no murmur, click, rub, or gallop             Dental  No notable dental hx       Pulmonary  Breath sounds clear to auscultation               Abdominal  GI exam deferred       Other Findings            Anesthetic Plan    ASA: 3  Anesthesia type: general            Anesthetic plan and risks discussed with: Patient

## 2021-12-07 VITALS
DIASTOLIC BLOOD PRESSURE: 93 MMHG | SYSTOLIC BLOOD PRESSURE: 133 MMHG | WEIGHT: 293 LBS | HEIGHT: 68 IN | BODY MASS INDEX: 44.41 KG/M2 | HEART RATE: 102 BPM | RESPIRATION RATE: 17 BRPM | OXYGEN SATURATION: 96 % | TEMPERATURE: 97.8 F

## 2021-12-07 LAB
ANION GAP SERPL CALC-SCNC: 5 MMOL/L (ref 3–18)
BUN SERPL-MCNC: 10 MG/DL (ref 7–18)
BUN/CREAT SERPL: 10 (ref 12–20)
CALCIUM SERPL-MCNC: 8.4 MG/DL (ref 8.5–10.1)
CHLORIDE SERPL-SCNC: 104 MMOL/L (ref 100–111)
CO2 SERPL-SCNC: 26 MMOL/L (ref 21–32)
CREAT SERPL-MCNC: 0.99 MG/DL (ref 0.6–1.3)
ERYTHROCYTE [DISTWIDTH] IN BLOOD BY AUTOMATED COUNT: 15.8 % (ref 11.6–14.5)
GLUCOSE BLD STRIP.AUTO-MCNC: 115 MG/DL (ref 70–110)
GLUCOSE BLD STRIP.AUTO-MCNC: 120 MG/DL (ref 70–110)
GLUCOSE SERPL-MCNC: 108 MG/DL (ref 74–99)
HCT VFR BLD AUTO: 33.5 % (ref 35–45)
HGB BLD-MCNC: 9.8 G/DL (ref 12–16)
MAGNESIUM SERPL-MCNC: 2.2 MG/DL (ref 1.6–2.6)
MCH RBC QN AUTO: 22.6 PG (ref 24–34)
MCHC RBC AUTO-ENTMCNC: 29.3 G/DL (ref 31–37)
MCV RBC AUTO: 77.4 FL (ref 78–100)
NRBC # BLD: 0 K/UL (ref 0–0.01)
NRBC BLD-RTO: 0 PER 100 WBC
PLATELET # BLD AUTO: 536 K/UL (ref 135–420)
PMV BLD AUTO: 9.6 FL (ref 9.2–11.8)
POTASSIUM SERPL-SCNC: 3.6 MMOL/L (ref 3.5–5.5)
RBC # BLD AUTO: 4.33 M/UL (ref 4.2–5.3)
SODIUM SERPL-SCNC: 135 MMOL/L (ref 136–145)
WBC # BLD AUTO: 15.2 K/UL (ref 4.6–13.2)

## 2021-12-07 PROCEDURE — 83735 ASSAY OF MAGNESIUM: CPT

## 2021-12-07 PROCEDURE — 74011250637 HC RX REV CODE- 250/637: Performed by: SURGERY

## 2021-12-07 PROCEDURE — C9113 INJ PANTOPRAZOLE SODIUM, VIA: HCPCS | Performed by: SURGERY

## 2021-12-07 PROCEDURE — 80048 BASIC METABOLIC PNL TOTAL CA: CPT

## 2021-12-07 PROCEDURE — 74011000250 HC RX REV CODE- 250: Performed by: SURGERY

## 2021-12-07 PROCEDURE — 82962 GLUCOSE BLOOD TEST: CPT

## 2021-12-07 PROCEDURE — 74011250636 HC RX REV CODE- 250/636: Performed by: SURGERY

## 2021-12-07 PROCEDURE — 2709999900 HC NON-CHARGEABLE SUPPLY

## 2021-12-07 PROCEDURE — 36415 COLL VENOUS BLD VENIPUNCTURE: CPT

## 2021-12-07 PROCEDURE — 85027 COMPLETE CBC AUTOMATED: CPT

## 2021-12-07 RX ORDER — OXYCODONE HYDROCHLORIDE 5 MG/1
5 TABLET ORAL
Qty: 10 TABLET | Refills: 0 | Status: SHIPPED | OUTPATIENT
Start: 2021-12-07 | End: 2021-12-10

## 2021-12-07 RX ORDER — ENOXAPARIN SODIUM 100 MG/ML
40 INJECTION SUBCUTANEOUS EVERY 24 HOURS
Qty: 7 EACH | Refills: 0 | Status: SHIPPED | OUTPATIENT
Start: 2021-12-07

## 2021-12-07 RX ORDER — ALBUTEROL SULFATE 0.83 MG/ML
2.5 SOLUTION RESPIRATORY (INHALATION)
Status: DISCONTINUED | OUTPATIENT
Start: 2021-12-07 | End: 2021-12-07 | Stop reason: HOSPADM

## 2021-12-07 RX ORDER — ONDANSETRON 4 MG/1
4 TABLET, ORALLY DISINTEGRATING ORAL
Qty: 12 TABLET | Refills: 0 | Status: SHIPPED | OUTPATIENT
Start: 2021-12-07

## 2021-12-07 RX ORDER — LANOLIN ALCOHOL/MO/W.PET/CERES
100 CREAM (GRAM) TOPICAL DAILY
Qty: 30 TABLET | Refills: 0 | Status: SHIPPED | OUTPATIENT
Start: 2021-12-07

## 2021-12-07 RX ADMIN — Medication 10 ML: at 06:03

## 2021-12-07 RX ADMIN — SODIUM CHLORIDE 40 MG: 9 INJECTION, SOLUTION INTRAMUSCULAR; INTRAVENOUS; SUBCUTANEOUS at 14:34

## 2021-12-07 RX ADMIN — ENOXAPARIN SODIUM 40 MG: 40 INJECTION SUBCUTANEOUS at 14:33

## 2021-12-07 RX ADMIN — ACETAMINOPHEN 650 MG: 325 TABLET ORAL at 14:33

## 2021-12-07 RX ADMIN — KETOROLAC TROMETHAMINE 15 MG: 30 INJECTION, SOLUTION INTRAMUSCULAR; INTRAVENOUS at 06:28

## 2021-12-07 RX ADMIN — SODIUM CHLORIDE, SODIUM LACTATE, POTASSIUM CHLORIDE, AND CALCIUM CHLORIDE 150 ML/HR: 600; 310; 30; 20 INJECTION, SOLUTION INTRAVENOUS at 02:10

## 2021-12-07 RX ADMIN — HYOSCYAMINE SULFATE 0.12 MG: 0.12 TABLET ORAL; SUBLINGUAL at 06:28

## 2021-12-07 NOTE — PROGRESS NOTES
Problem: Falls - Risk of  Goal: *Absence of Falls  Description: Document Zohaib Luke Fall Risk and appropriate interventions in the flowsheet.   Outcome: Progressing Towards Goal  Note: Fall Risk Interventions:            Medication Interventions: Evaluate medications/consider consulting pharmacy, Patient to call before getting OOB, Teach patient to arise slowly    Elimination Interventions: Call light in reach, Elevated toilet seat, Patient to call for help with toileting needs, Toilet paper/wipes in reach    History of Falls Interventions: Evaluate medications/consider consulting pharmacy         Problem: Patient Education: Go to Patient Education Activity  Goal: Patient/Family Education  Outcome: Progressing Towards Goal     Problem: Patient Education: Go to Patient Education Activity  Goal: Patient/Family Education  Outcome: Progressing Towards Goal     Problem: Gastric Sleeve Pathway / Bariatric Revision Pathway: Day of Surgery  Goal: Off Pathway (Use only if patient is Off Pathway)  Outcome: Progressing Towards Goal  Goal: Activity/Safety  Outcome: Progressing Towards Goal  Goal: Consults, if ordered  Outcome: Progressing Towards Goal  Goal: Diagnostic Test/Procedures  Outcome: Progressing Towards Goal  Goal: Nutrition/Diet  Outcome: Progressing Towards Goal  Goal: Medications  Outcome: Progressing Towards Goal  Goal: Respiratory  Outcome: Progressing Towards Goal  Goal: Treatments/Interventions/Procedures  Outcome: Progressing Towards Goal  Goal: Psychosocial  Outcome: Progressing Towards Goal  Goal: *No signs and symptoms of infection or wound complications  Outcome: Progressing Towards Goal  Goal: *Optimal pain control at patient's stated goal  Outcome: Progressing Towards Goal  Goal: *Adequate urinary output (equal to or greater than 30 milliliters/hour)  Outcome: Progressing Towards Goal  Goal: *Hemodynamically stable  Outcome: Progressing Towards Goal  Goal: *Tolerating diet  Outcome: Progressing Towards Goal  Goal: *Demonstrates progressive activity  Outcome: Progressing Towards Goal  Goal: *Absence of signs and symptoms of DVT  Outcome: Progressing Towards Goal  Goal: *Labs within defined limits  Outcome: Progressing Towards Goal  Goal: *Oxygen saturation within defined limits  Outcome: Progressing Towards Goal     Problem: Pain  Goal: *Control of Pain  Outcome: Progressing Towards Goal  Goal: *PALLIATIVE CARE:  Alleviation of Pain  Outcome: Progressing Towards Goal     Problem: Patient Education: Go to Patient Education Activity  Goal: Patient/Family Education  Outcome: Progressing Towards Goal

## 2021-12-07 NOTE — PROGRESS NOTES
Surgery Progress Note    12/7/2021    Admit Date: 12/6/2021    Subjective:     Patient has complaints of pain and is controlled with current plan. Patient has been ambulating in halls. She reports nausea and no vomiting. Bowel Movements: None    Objective:     Blood pressure 129/79, pulse (!) 104, temperature 97.8 °F (36.6 °C), resp. rate 18, height 5' 8\" (1.727 m), weight (!) 231.3 kg (510 lb), SpO2 92 %. No intake/output data recorded. 12/05 1901 - 12/07 0700  In: 2965 [P.O.:120;  I.V.:2845]  Out: 600 [Urine:600]    EXAM: GENERAL: alert, pleasant, no distress   HEART: regular rate and rhythm   LUNGS: clear to auscultation   ABDOMEN:  Soft, obese, appropriate incisional tenderness, +BS, non-distended, surgical incisions clean, dry, no erythema or drainage   EXTREMITIES: warm, well perfused    Data Review    Recent Results (from the past 24 hour(s))   GLUCOSE, POC    Collection Time: 12/06/21 11:28 PM   Result Value Ref Range    Glucose (POC) 129 (H) 70 - 110 mg/dL   CBC W/O DIFF    Collection Time: 12/07/21  4:01 AM   Result Value Ref Range    WBC 15.2 (H) 4.6 - 13.2 K/uL    RBC 4.33 4.20 - 5.30 M/uL    HGB 9.8 (L) 12.0 - 16.0 g/dL    HCT 33.5 (L) 35.0 - 45.0 %    MCV 77.4 (L) 78.0 - 100.0 FL    MCH 22.6 (L) 24.0 - 34.0 PG    MCHC 29.3 (L) 31.0 - 37.0 g/dL    RDW 15.8 (H) 11.6 - 14.5 %    PLATELET 833 (H) 493 - 420 K/uL    MPV 9.6 9.2 - 11.8 FL    NRBC 0.0 0  WBC    ABSOLUTE NRBC 0.00 0.00 - 0.27 K/uL   METABOLIC PANEL, BASIC    Collection Time: 12/07/21  4:01 AM   Result Value Ref Range    Sodium 135 (L) 136 - 145 mmol/L    Potassium 3.6 3.5 - 5.5 mmol/L    Chloride 104 100 - 111 mmol/L    CO2 26 21 - 32 mmol/L    Anion gap 5 3.0 - 18 mmol/L    Glucose 108 (H) 74 - 99 mg/dL    BUN 10 7.0 - 18 MG/DL    Creatinine 0.99 0.6 - 1.3 MG/DL    BUN/Creatinine ratio 10 (L) 12 - 20      GFR est AA >60 >60 ml/min/1.73m2    GFR est non-AA >60 >60 ml/min/1.73m2    Calcium 8.4 (L) 8.5 - 10.1 MG/DL   MAGNESIUM Collection Time: 12/07/21  4:01 AM   Result Value Ref Range    Magnesium 2.2 1.6 - 2.6 mg/dL   GLUCOSE, POC    Collection Time: 12/07/21  6:05 AM   Result Value Ref Range    Glucose (POC) 115 (H) 70 - 110 mg/dL       Assessment:   Arron Lesches is a 31 y.o. female,  day 1 status post   1. Laparoscopic Chela-en-Y gastric bypass utilizing a 15 mL separate tubularized gastric pouch based on the lesser curvature of the stomach, a 767 cm retrocolic retrogastric Chela limb and a 40-cm biliopancreatic limb. 2.  Laparoscopic left hepatic lobe wedge biopsy.   Condition: good    Plan:   -Ambulate every four hours  -Pain managed prior to d/c   -Nausea managed prior to d/c   -Advance to Clear liquid Gastric Bypass Diet, if able to tolerate clear liquid diet (with pro shake) 4oz per hour for 3 hours prior to d/c    If patient continues to progress d/c home later today     Wei Mccallum NP  10:53 AM  12/7/2021

## 2021-12-07 NOTE — OP NOTES
[address] [place] [address]   OPERATIVE REPORT    Name:  Shwetha Smith  MR#:   [id number]  :  1985  ACCOUNT #:  [de-identified]  DATE OF SERVICE:  2021      PREOPERATIVE DIAGNOSES:  1. Super, super, super obesity with a body mass index of 78 with obesity-related comorbidities of prediabetes, reactive airway disease, chronic obstructive sleep apnea, and weight-related arthropathy. 2.  Hepatic steatosis. POSTOPERATIVE DIAGNOSES:  1. Super, super, super obesity with a body mass index of 78 and obesity-related comorbidities consisting of prediabetes, reactive airway disease, chronic obstructive sleep apnea, and weight-related arthropathy. 2.  Massive hepatomegaly with a morphologic appearance of hepatitic steatosis. PROCEDURES PERFORMED:  1. Laparoscopic Chela-en-Y gastric bypass utilizing a 15 mL separate tubularized gastric pouch based on the lesser curvature of the stomach, a 113 cm retrocolic retrogastric Chela limb and a 40-cm biliopancreatic limb. 2.  Laparoscopic left hepatic lobe wedge biopsy. SURGEON:  Luba Rios DO    FIRST ASSISTANT:  Dr. Laxmi Dongine:  General endotracheal supplemented at the conclusion of the operative procedure with local infiltration of the operative sites with 266 mg of Exparel diluted in 50 mL of normal saline solution. COMPLICATIONS:  None    SPECIMENS REMOVED:  Left hepatic lobe wedge biopsy. IMPLANTS:  None    ESTIMATED BLOOD LOSS:  25 mL. OPERATIVE FINDINGS:  The patient had an extremely thick abdominal wall which made the surgical procedure very difficult as a result of the torque generated utilizing laparoscopic instruments after the ports. The patient in addition had massive hepatomegaly with morphologic appearance of hepatic steatosis which again greatly increased the difficulty of the procedure secondary to inadequate and difficult exposure.     INDICATIONS FOR THE SURGICAL PROCEDURE:  This is a 57-year-old black female with failed medical management of her super, super, super obesity and after investigating the surgical options, has elected to pursue laparoscopic potentially open Chela-en-Y gastric bypass to achieve definitive durable weight loss on a personal level with expected resolution of obesity-related comorbidities. The patient in addition has been consented for laparoscopic potentially open left hepatic lobe wedge biopsy to definitively histologically characterize the suspicion of hepatic steatosis. The risks and benefits of operative versus nonoperative potential alternatives and potential complications up to and including death were again discussed with the patient who voiced her understanding and wished to proceed. DESCRIPTION OF PROCEDURE:  The patient received Ancef for antibiotic prophylaxis and Lovenox for DVT prophylaxis in the preoperative staging area. After voiding and then signing her operative permit, which was properly witnessed, she was then transported to the operating room where she was placed in the supine position on the operating table and SCDs were placed and inflated prior to the induction of general endotracheal anesthesia. A 34-Kazakh orogastric tube was placed and utilized for decompression of the stomach as well as a sizing template for construction of a tubularized gastric pouch. The patient's abdomen was then prepped and draped in the usual sterile fashion. A timeout procedure was performed according to protocol and agreed upon by all personnel in the operating suite. A transverse 12 mm incision was then made 10 cm above the level of the umbilicus in the midline through which a 12 mm Optiview trocar and cannula was placed into the peritoneal cavity under direct vision utilizing a 10 mm 0-degree laparoscope. The laparoscope and trocar were removed. The abdomen was then insufflated with carbon dioxide gas never exceeding a pressure of 15 mmHg.   The regular length 12 mm trocar was then exchanged for an extra long trocar and these were utilized throughout the remainder of the procedure. This was necessary secondary to the extreme thickness of the abdominal wall of the patient. The remaining ports were then placed through transverse cutaneous incisions utilizing Optiview trocars and cannulas, the second, a 5-mm incision in the left anterior axillary line 2 fingerbreadths below the left costal margin, the third a 12-mm incision midway between the left upper quadrant and supraumbilical incision, and the fourth in the right paramedian location 8 cm from the midline midway between the costal margin and level of the initial trocar site. After placing the appropriately sized trocars, the upper abdomen was visualized and it was noted the patient had massive hepatomegaly with morphologic appearance of hepatic steatosis. The omentum and transverse colon were then reflected superiorly. The ligament of Treitz was identified; 40 cm distal to the ligament of Treitz, the jejunum was transected with a triple-load stapling device 60-mm in length with 2.5-mm staples. The mesentery was then divided down to its root utilizing the Harmonic scalpel. The Chela limb was measured to be 155 cm in length and at this point at the antimesenteric border, enterotomy was created with the Harmonic scalpel. A similar antimesenteric enterotomy was created 2 cm proximal to the staple line of the biliopancreatic limb, and a stapled side-to-side functional end-to-side jejunojejunostomy was constructed utilizing a triple-load stapling device, 60 mm in length, loaded with 2.5-mm staples placing one arm of the stapling device into each of the respective limbs prior to firing it on their antimesenteric borders. The remaining enteric defect was then closed with a running full thickness 3-0 Vicryl suture, and the mesenteric defect was closed with a running 2-0 silk suture.   The ligament of Treitz was re-identified; just anterior to ligament of Treitz, the fenestration was created through the mesocolon and the lesser sac through which the Chela limb was placed into the lesser sac. The omentum and transverse colon were reflected back to their normal anatomic positions. The margot was identified along the lesser curvature of the stomach. Just inferior to the margot along the greater curvature of the stomach, the omentum was  from the gastric wall utilizing the Harmonic scalpel until the Chela limb was visualized within the lesser sac. A transverse 12 mm cutaneous incision was then made one-third the distance from the xiphisternal junction to the umbilicus in the midline through which a 10-mm stone grasping forceps was utilized in conjunction with a self-retaining retractor in an attempt to elevate the left lobe of the liver, this,  however, because of the massive size of the liver was unable to be accomplished successfully, and so instead of pursuing the usual opening of the peritoneum at the level of the hiatus, this was not pursued as we could not adequately visualize the hiatus. The 34-Somali orogastric tube was then retracted into the esophagus. At a distance 5-cm distal to the GE junction, the neurovascular elements of the lesser omentum were  from the gastric wall utilizing a Harmonic scalpel. Completion of lesser curve fenestration was accomplished utilizing an esophageal retractor introduced through the omental fenestration along the greater curvature of the stomach posterior to the stomach. A 34-Somali orogastric tube was assured to be retracted into the esophagus. A triple-load stapling device, 60 mm in length, loaded with 3.5-mm staples was placed into the lesser curve fenestration, it was oriented to fit 5 cm distal to the GE junction perpendicular to the lesser curve prior to firing two-thirds the length of the staple load.   The 34-Somali orogastric tube was then advanced utilizing the sizing template for construction of the tubularized gastric pouch based on the lesser curvature of the stomach. The vertical aspect of the pouch was initiated with a triple-load stapling device, 60 mm in length, loaded with 3.5-mm staples utilizing two-thirds the length of staple load. The remainder of the pouch was constructed with sequential firings of a triple-load stapling device, 60 mm in length, loaded with 3.5-mm staples elevating the left lobe of the liver as the procedure progressed and also allowing the stomach to be pulled inferiorly. The transection of the pouch was set at the angle of His and this created a 15 mL separate tubularized gastric pouch based on the lesser curvature of the stomach. It should be noted that the initial full-length firing of the 3.5-mm staple load utilized an Ethicon staple line reinforcement absorbable implant. The Chela limb was then elevated posterior to the stomach in a retrogastric position where a tension-free hand-sewn 2-layered gastrojejunostomy was constructed. The geometric orientation of the gastrojejunostomy was at the distal aspect of the tubularized gastric pouch and the antimesenteric border of the Chela limb 2 cm distal to the staple line. The posterior row of running seromuscular 3-0 Vicryl suture was placed. A transverse 12-mm gastrotomy was made at the distal aspect of the tubularized gastric pouch with an adjacent antimesenteric 12-mm Chela limb enterotomy. The running inner layer of full-thickness 3-0 Vicryl suture was initiated in the left lateral posterior aspect of the anastomosis running across the posterior aspect of the anastomosis around the right lateral aspect of the anastomosis to the anterior midline. A second full-thickness 3-0 Vicryl suture was initiated adjacent to the origin of the first and running around the left lateral aspect of the anastomosis to the anterior midline.   The 34-Yakut orogastric tube was advanced across the gastrojejunal anastomosis into the Chela limb and the 3-0 full-thickness Vicryl sutures were tied together anteriorly. The gastrojejunal anastomosis was then completed anteriorly utilizing a running 3-0 Vicryl seromuscular suture. The 34-Romansh orogastric tube was removed and after assuring there was adequate redundancy of the Chela limb above the level of the mesocolon, the omentum and transverse colon were reflected superiorly. The space through which a Mancilla's hernia might occur was closed with a running 2-0 silk suture and the mesocolic defect was closed with a series of simple interrupted 2-0 silk sutures. The omentum and transverse colon were returned to their normal anatomic position. The Chela limb was noted to be viable with adequate redundancy above the level of the mesocolon, and there was no tension noted at the gastrojejunal anastomosis. The stone grasping forceps were removed as was the self-retaining retractor. The epigastric fascial defect was closed with a suture passing device and #2 Vicryl suture. The free edge of the left lobe of the liver was then retracted in such a fashion so that a 1.5 cm wedge-shaped biopsy could be obtained for definitive histologic characterization. Hemostasis was then established with electrocautery. The laparoscope was then shifted to the left mid-abdominal port to allow visualization of the supraumbilical fascial trocar defect, which was closed with a suture passing device and a #2 Vicryl suture. All operative sites were inspected and noted to be hemostatic. The abdomen was desufflated off carbon dioxide gas. Trocars were removed under direct vision. The fascial sutures were tied. The wounds were irrigated with normal saline solution and closure of the skin was accomplished with a series of simple interrupted buried deep dermal 4-0 Monocryl sutures and subsequently infiltrated with 266 mg of Exparel diluted in 50 mL of normal saline solution. Steri-Strips were applied as were sterile dressings.   The sponge and needle counts were correct both during and at the completion of the procedure. The patient tolerated the procedure without operative complications, subsequently was extubated and transported to the recovery room in awake, alert, and stable condition. The patient received 1500 mL of crystalloid during the procedure. The operative start time 1150 and completion time was 1444.       DO JULIO [email or web address]  D:  12/06/2021 15:21  T:  12/06/2021 23:32  JOB #:  [id number]

## 2021-12-07 NOTE — ROUTINE PROCESS
Bedside verbal report given to Mary Jo Mathias, Oncoming Nurse. Report consisted of patients Situation, Background, Assessment and   Recommendations(SBAR). Information from the following report(s): Kardex, MAR and Recent Results was reviewed with the oncoming nurse. Opportunity for questions and clarification was provided.

## 2021-12-07 NOTE — ROUTINE PROCESS
Patient was educated on lovenox injections. 1. Wash and dry hands  2. Sit or lie in comfortable position  3. Choose an area around love handles 3 inches away from [facility]  4. Clean site with alcohol and let dry  5. Remove needle cap pull straight out  6. With your other and pinch an inch of the cleanses area and insert full length of needle straight in 90 degree  7. Press the plunger with your thumb slowly until syringe is empty. Pull needle out and point away from you. Push down on plunger to activate the safety shield. Push hard  8. Place the syringe in a plastic bottle and dispose in trash. 9. Rotate sites  10. Do not remove air from syringe before injection  11.  Do not rub the area

## 2021-12-07 NOTE — DISCHARGE SUMMARY
Bariatric Surgery Discharge Progress Note    Admission Date: 12/6/2021    Discharge Date: 12/7/2021    PREOPERATIVE DIAGNOSES:  1. Super, super, super obesity with a body mass index of 78 with obesity-related comorbidities of prediabetes, reactive airway disease, chronic obstructive sleep apnea, and weight-related arthropathy. 2.  Hepatic steatosis. POSTOPERATIVE DIAGNOSES:  1. Super, super, super obesity with a body mass index of 78 and obesity-related comorbidities consisting of prediabetes, reactive airway disease, chronic obstructive sleep apnea, and weight-related arthropathy. 2.  Massive hepatomegaly with a morphologic appearance of hepatitic steatosis. PROCEDURES PERFORMED:  1. Laparoscopic Chela-en-Y gastric bypass utilizing a 15 mL separate tubularized gastric pouch based on the lesser curvature of the stomach, a 471 cm retrocolic retrogastric Chela limb and a 40-cm biliopancreatic limb. 2.  Laparoscopic left hepatic lobe wedge biopsy. Postop Complications: none    Hospital Course:  Patient was admitted on 12/6/2021 for scheduled bariatric surgery. Operation was without significant complication. Patient admitted to the floor postoperatively, monitored as per protocol. Diet sequentially advanced beginning POD 1, pain medications transitioned to oral during the hospital course. Currently the patient is afebrile, vital signs stable, tolerating a clear liquid diet with protein supplementation, voiding spontaneously, ambulatory with adequate pain control with oral medications and clear surgical sites without evidence of infection. Discharge Diet:  Clear Liquid Bariatric Diet for 7 days, then soft moist protein diet for 5 weeks    Discharge Medications:  Discharge Medication List as of 12/7/2021  6:00 PM        START taking these medications    Details   enoxaparin (LOVENOX) 40 mg/0.4 mL 0.4 mL by SubCUTAneous route every twenty-four (24) hours. , Normal, Disp-7 Each, R-0      oxyCODONE IR (ROXICODONE) 5 mg immediate release tablet Take 1 Tablet by mouth every six (6) hours as needed for Pain for up to 3 days. Max Daily Amount: 20 mg., Normal, Disp-10 Tablet, R-0      ondansetron (ZOFRAN ODT) 4 mg disintegrating tablet Take 1 Tablet by mouth every eight (8) hours as needed for Nausea or Vomiting., Normal, Disp-12 Tablet, R-0      thiamine HCL (B-1) 100 mg tablet Take 1 Tablet by mouth daily. , Normal, Disp-30 Tablet, R-0           CONTINUE these medications which have NOT CHANGED    Details   acetaminophen (Tylenol Extra Strength) 500 mg tablet Take  by mouth every six (6) hours as needed for Pain., Historical Med      albuterol (PROVENTIL HFA, VENTOLIN HFA, PROAIR HFA) 90 mcg/actuation inhaler Take 2 Puffs by inhalation every six (6) hours as needed for Wheezing., Normal, Disp-1 Inhaler, R-3pls dispense brand paid for by her insurance except for [employer] which has a recall      diphenhydrAMINE (Benadryl Allergy) 25 mg tablet Take 25 mg by mouth every six (6) hours as needed., Historical Med      ibuprofen (MOTRIN) 800 mg tablet Take 1 Tab by mouth every eight (8) hours as needed for Pain., Normal, Disp-60 Tab, R-3           STOP taking these medications       hydroCHLOROthiazide (HYDRODIURIL) 25 mg tablet Comments:   Reason for Stopping:                 Bariatric Chewable vitamins, 2 orally daily for life  Calcium Citrate 1500 mg orally daily for life  Vitamin B12 1000 micrograms sublingual daily for life  Vitamin D3 5,000 units orally daily for life   Vitamin B1 100 mg orally daily for life    Discharge disposition: home    Discharge condition: stable      Local wound care with daily showers, keep wounds clean and dry     Activity: as desired, no lifting, pushing, or pulling greater than 15lbs or situps for 30 days     Special Instructions:            - No driving until activity is not influenced by incisional pain and off narcotics            - No bath or hot tub until wounds are healed            - Check pulse and temperature twice daily for 10 days            - Notify 3 [facility] for a Temp >100.5 or Pulse>115     Follow-up with your surgeon in 2 weeks, call office for appointment [phone] ([address] [place] [address]) [phone]([address])

## 2021-12-07 NOTE — PROGRESS NOTES
Reason for Admission:  Morbid obesity ([facility] [E66.01]  Morbid obesity with BMI of 70 and over, adult ([facility] [E66.01, Z68.45]                 RUR Score:    5%            Plan for utilizing home health:    no                      Likelihood of Readmission:   LOW                         Transition of Care Plan:              Initial assessment completed with patient. Cognitive status of patient: oriented to time, place, person and situation. Face sheet information confirmed:  yes. The patient designates Mat Castro, her brother to participate in her discharge plan and to receive any needed information. This patient lives in a single family home with brother. Patient is able to navigate steps as needed. Prior to hospitalization, patient was considered to be independent with ADLs/IADLS : yes . Patient has a current ACP document on file: no      Healthcare Decision Maker:     Click here to complete [employer] including selection of the Healthcare Decision Maker Relationship (ie \"Primary\")    The brother will be available to transport patient home upon discharge. The patient denies any DMEs at home. Patient is not currently active with home health. Patient has not stayed in a skilled nursing facility or rehab. This patient is on dialysis :no    Currently, the discharge plan is Home. The patient states that she can obtain her medications from the pharmacy, and take her medications as directed. Patient's current insurance is Medicaid.                   Amanda Nichols RN Case Management

## 2021-12-07 NOTE — ROUTINE PROCESS
Received report from [facility]. Pt AAOx3, NAD, breathing non labored, on room air, up in the recliner. IV site clean, dry and intact. IVF going per order. Abdominal trocar sites w/ band aids. Pt's aware that she is due to void. Bed at the lowest level on lock position, call bell w/i reach. Pt voided and ambulated in the hallway. Bedside and Verbal shift change report given to [facility] (oncoming nurse) by me (offgoing nurse). Report included the following information SBAR, Kardex, Procedure Summary, Intake/Output, MAR and Recent Results.

## 2021-12-07 NOTE — PROGRESS NOTES
Problem: Falls - Risk of  Goal: *Absence of Falls  Description: Document Estrella Johnson Fall Risk and appropriate interventions in the flowsheet.   12/7/2021 1817 by Angeles Cleary RN  Outcome: Resolved/Met  12/7/2021 1259 by Angeles Cleary RN  Outcome: Progressing Towards Goal  Note: Fall Risk Interventions:            Medication Interventions: Patient to call before getting OOB    Elimination Interventions: Call light in reach    History of Falls Interventions: Room close to nurse's station         Problem: Patient Education: Go to Patient Education Activity  Goal: Patient/Family Education  12/7/2021 1817 by Angeles Cleary RN  Outcome: Resolved/Met  12/7/2021 1259 by Angeles Cleary RN  Outcome: Progressing Towards Goal     Problem: Patient Education: Go to Patient Education Activity  Goal: Patient/Family Education  12/7/2021 1817 by Angeles Cleary RN  Outcome: Resolved/Met  12/7/2021 1259 by Angeles Cleary RN  Outcome: Progressing Towards Goal     Problem: Gastric Sleeve Pathway / Bariatric Revision Pathway: Day of Surgery  Goal: Off Pathway (Use only if patient is Off Pathway)  12/7/2021 1817 by Angeles Cleary RN  Outcome: Resolved/Met  12/7/2021 1259 by Angeles Cleary RN  Outcome: Progressing Towards Goal  Goal: Activity/Safety  12/7/2021 1817 by Angeles Cleary RN  Outcome: Resolved/Met  12/7/2021 1259 by Angeles Cleary RN  Outcome: Progressing Towards Goal  Goal: Consults, if ordered  12/7/2021 1817 by Angeles Cleary RN  Outcome: Resolved/Met  12/7/2021 1259 by Angeles Cleary RN  Outcome: Progressing Towards Goal  Goal: Diagnostic Test/Procedures  12/7/2021 1817 by Angeles Cleary RN  Outcome: Resolved/Met  12/7/2021 1259 by Angeles Cleary RN  Outcome: Progressing Towards Goal  Goal: Nutrition/Diet  12/7/2021 1817 by Angeles Cleary RN  Outcome: Resolved/Met  12/7/2021 1259 by Angeles Cleary RN  Outcome: Progressing Towards Goal  Goal: Medications  12/7/2021 1817 by Lake Hernandez RN  Outcome: Resolved/Met  12/7/2021 1259 by Lake Hernandez RN  Outcome: Progressing Towards Goal  Goal: Respiratory  12/7/2021 1817 by Lake Hernandez, RN  Outcome: Resolved/Met  12/7/2021 1259 by Lake Hernandez RN  Outcome: Progressing Towards Goal  Goal: Treatments/Interventions/Procedures  12/7/2021 1817 by Lake Hernandez, RN  Outcome: Resolved/Met  12/7/2021 1259 by Lake Hernandez RN  Outcome: Progressing Towards Goal  Goal: Psychosocial  12/7/2021 1817 by Lake Hernandez, RN  Outcome: Resolved/Met  12/7/2021 1259 by Lake Hernandez RN  Outcome: Progressing Towards Goal  Goal: *No signs and symptoms of infection or wound complications  68/5/5759 1817 by Lake Hernandez, RN  Outcome: Resolved/Met  12/7/2021 1259 by Lake Hernandez RN  Outcome: Progressing Towards Goal  Goal: *Optimal pain control at patient's stated goal  12/7/2021 1817 by Lake Hernandez RN  Outcome: Resolved/Met  12/7/2021 1259 by Lake Hernandez RN  Outcome: Progressing Towards Goal  Goal: *Adequate urinary output (equal to or greater than 30 milliliters/hour)  12/7/2021 1817 by Lake Hernandez, RN  Outcome: Resolved/Met  12/7/2021 1259 by Lake Hernandez RN  Outcome: Progressing Towards Goal  Goal: *Hemodynamically stable  12/7/2021 1817 by Lake Hernandez, RN  Outcome: Resolved/Met  12/7/2021 1259 by Lake Hernandez RN  Outcome: Progressing Towards Goal  Goal: *Tolerating diet  12/7/2021 1817 by Lake Hernandez, RN  Outcome: Resolved/Met  12/7/2021 1259 by Lake Hernandez RN  Outcome: Progressing Towards Goal  Goal: *Demonstrates progressive activity  12/7/2021 1817 by Lake Hernandez, RN  Outcome: Resolved/Met  12/7/2021 1259 by Lake Hernandez RN  Outcome: Progressing Towards Goal  Goal: *Absence of signs and symptoms of DVT  12/7/2021 1817 by Lake Hernandez, RN  Outcome: Resolved/Met  12/7/2021 1259 by Lake Hernandez RN  Outcome: Progressing Towards Goal  Goal: *Labs within defined limits  12/7/2021 1817 by Ashlee Li RN  Outcome: Resolved/Met  12/7/2021 1259 by Ashlee Li RN  Outcome: Progressing Towards Goal  Goal: *Oxygen saturation within defined limits  12/7/2021 1817 by Ashlee Li RN  Outcome: Resolved/Met  12/7/2021 1259 by Ashlee Li RN  Outcome: Progressing Towards Goal     Problem: Pain  Goal: *Control of Pain  12/7/2021 1817 by Ashlee Li RN  Outcome: Resolved/Met  12/7/2021 1259 by Ashlee Li RN  Outcome: Progressing Towards Goal  Goal: *PALLIATIVE CARE:  Alleviation of Pain  12/7/2021 1817 by Ashlee Li RN  Outcome: Resolved/Met  12/7/2021 1259 by Ashlee Li RN  Outcome: Progressing Towards Goal     Problem: Patient Education: Go to Patient Education Activity  Goal: Patient/Family Education  12/7/2021 1817 by Ashlee Li RN  Outcome: Resolved/Met  12/7/2021 1259 by Ashlee Li RN  Outcome: Progressing Towards Goal

## 2021-12-07 NOTE — PROGRESS NOTES
Discharge order noted for today. Orders reviewed. No needs indentified at this time.  remains available if needed. Barb Oneil her brother is going to take her home. Gaston Morrison RN Case Management.

## 2021-12-07 NOTE — PROGRESS NOTES
Problem: Falls - Risk of  Goal: *Absence of Falls  Description: Document Franco Leon Fall Risk and appropriate interventions in the flowsheet.   Outcome: Progressing Towards Goal  Note: Fall Risk Interventions:            Medication Interventions: Patient to call before getting OOB    Elimination Interventions: Call light in reach    History of Falls Interventions: Room close to nurse's station         Problem: Patient Education: Go to Patient Education Activity  Goal: Patient/Family Education  Outcome: Progressing Towards Goal     Problem: Patient Education: Go to Patient Education Activity  Goal: Patient/Family Education  Outcome: Progressing Towards Goal     Problem: Gastric Sleeve Pathway / Bariatric Revision Pathway: Day of Surgery  Goal: Off Pathway (Use only if patient is Off Pathway)  Outcome: Progressing Towards Goal  Goal: Activity/Safety  Outcome: Progressing Towards Goal  Goal: Consults, if ordered  Outcome: Progressing Towards Goal  Goal: Diagnostic Test/Procedures  Outcome: Progressing Towards Goal  Goal: Nutrition/Diet  Outcome: Progressing Towards Goal  Goal: Medications  Outcome: Progressing Towards Goal  Goal: Respiratory  Outcome: Progressing Towards Goal  Goal: Treatments/Interventions/Procedures  Outcome: Progressing Towards Goal  Goal: Psychosocial  Outcome: Progressing Towards Goal  Goal: *No signs and symptoms of infection or wound complications  Outcome: Progressing Towards Goal  Goal: *Optimal pain control at patient's stated goal  Outcome: Progressing Towards Goal  Goal: *Adequate urinary output (equal to or greater than 30 milliliters/hour)  Outcome: Progressing Towards Goal  Goal: *Hemodynamically stable  Outcome: Progressing Towards Goal  Goal: *Tolerating diet  Outcome: Progressing Towards Goal  Goal: *Demonstrates progressive activity  Outcome: Progressing Towards Goal  Goal: *Absence of signs and symptoms of DVT  Outcome: Progressing Towards Goal  Goal: *Labs within defined limits  Outcome: Progressing Towards Goal  Goal: *Oxygen saturation within defined limits  Outcome: Progressing Towards Goal     Problem: Pain  Goal: *Control of Pain  Outcome: Progressing Towards Goal  Goal: *PALLIATIVE CARE:  Alleviation of Pain  Outcome: Progressing Towards Goal     Problem: Patient Education: Go to Patient Education Activity  Goal: Patient/Family Education  Outcome: Progressing Towards Goal

## 2021-12-22 ENCOUNTER — TELEPHONE (OUTPATIENT)
Dept: SURGERY | Age: 36
End: 2021-12-22

## 2021-12-22 NOTE — TELEPHONE ENCOUNTER
Patient LVM on nurse line stating one of her incisions may be infected and she may need medication. I LVM for patient to call office regarding message.

## 2022-01-17 ENCOUNTER — TELEPHONE (OUTPATIENT)
Dept: BARIATRICS/WEIGHT MGMT | Age: 37
End: 2022-01-17

## 2022-01-17 NOTE — TELEPHONE ENCOUNTER
Called Patient for her 6-week post op telephone appointment. No answer, could not leave message due to her mailbox full. Called twice and finally sent email if she would like to re-schedule.  Sergo Mitchell RDN

## 2022-01-31 ENCOUNTER — TELEPHONE (OUTPATIENT)
Dept: OTHER | Age: 37
End: 2022-01-31

## 2022-03-10 ENCOUNTER — OFFICE VISIT (OUTPATIENT)
Dept: SURGERY | Age: 37
End: 2022-03-10
Payer: MEDICAID

## 2022-03-10 VITALS
TEMPERATURE: 97.1 F | WEIGHT: 293 LBS | BODY MASS INDEX: 44.41 KG/M2 | SYSTOLIC BLOOD PRESSURE: 143 MMHG | HEIGHT: 68 IN | RESPIRATION RATE: 16 BRPM | DIASTOLIC BLOOD PRESSURE: 95 MMHG | HEART RATE: 84 BPM

## 2022-03-10 DIAGNOSIS — K91.2 POSTOPERATIVE INTESTINAL MALABSORPTION: Primary | ICD-10-CM

## 2022-03-10 PROCEDURE — 99024 POSTOP FOLLOW-UP VISIT: CPT | Performed by: SURGERY

## 2022-03-10 RX ORDER — IBUPROFEN 200 MG
200 CAPSULE ORAL DAILY
COMMUNITY

## 2022-03-10 RX ORDER — UREA 10 %
100 LOTION (ML) TOPICAL DAILY
COMMUNITY

## 2022-03-10 NOTE — PROGRESS NOTES
Pt ID confirmed    Weight Loss Metrics 3/10/2022 3/10/2022 12/6/2021 12/2/2021 10/28/2021 10/28/2021 9/29/2021   Pre op / Initial Wt 509 - - - 509 - -   Today's Wt - 432 lb - 510 lb - 509 lb 507 lb 9.6 oz   BMI - 65.69 kg/m2 77.55 kg/m2 - - 77.39 kg/m2 77.18 kg/m2   Ideal Body Wt 143 - - - 142 - -   Excess Body Wt 366 - - - 367 - -   Goal Wt 216 - - - 216 - -   Wt loss to date 77 - - - 0 - -   % Wt Loss 0.26 - - - 0 - -   80% .8 - - - 293.6 - -       Body mass index is 65.69 kg/m². 1. Have you been to the ER, urgent care clinic since your last visit? Hospitalized since your last visit? yes  2. Have you seen or consulted any other health care providers outside of the [address] [place] [address] since your last visit? Include any pap smears or colon screening.  No

## 2022-03-10 NOTE — PROGRESS NOTES
Bariatric Follow-Up Evaluation    Jayne Argueta Andrea Mancera is a 31 y. o. white female status post laparoscopic gastric bypass December 6, 2021 who presents in follow-up without complaint. Patient missed her early postoperative evaluation secondary mayra Covid  Compliant with and tolerant of the early post gastric bypass which is not yet been advanced. Patient notes appropriate early satiety no specific food intolerances or pathologic emesis and has not experienced dumping syndrome and she has avoided high density carbohydrates  Compliant with multivitamin, calcium citrate, B1, B12 and vitamin D supplementation  Exercise regimen: Walking 30 to 45 minutes on a daily basis  Comorbidities: Prediabetes, reactive airway diseaseresolved                          weight related arthropathyimproved  Preoperative weight: 509  Current weight: 432 BMI: 66  Estimated postsurgical weight loss: 293  Current weight loss: 77  Percentage weight loss: 26% / 3 months      Weight Loss Metrics 3/10/2022 3/10/2022 12/6/2021 12/2/2021 10/28/2021 10/28/2021 9/29/2021   Pre op / Initial Wt 509 - - - 509 - -   Today's Wt - 432 lb - 510 lb - 509 lb 507 lb 9.6 oz   BMI - 65.69 kg/m2 77.55 kg/m2 - - 77.39 kg/m2 77.18 kg/m2   Ideal Body Wt 143 - - - 142 - -   Excess Body Wt 366 - - - 367 - -   Goal Wt 216 - - - 216 - -   Wt loss to date 77 - - - 0 - -   % Wt Loss 0.26 - - - 0 - -   80% .8 - - - 293.6 - -       Allergies   Allergen Reactions    Bactrim [Sulfamethoprim Ds] Other (comments)     Blisters-Abdirahman Gino's    Iodine Hives and Swelling     Topical only    Sulfamethoxazole-Trimethoprim Unknown (comments)     Pt states inside of her mouth feels raw when she takes Bactrim. Current Outpatient Medications on File Prior to Visit   Medication Sig Dispense Refill    multivitamin with iron (FLINTSTONES) chewable tablet Take 1 Tablet by mouth daily.  cyanocobalamin (Vitamin B-12) 100 mcg tablet Take 100 mcg by mouth daily.  calcium citrate 200 mg (950 mg) tablet Take 200 mg by mouth daily.  iron bis-gly/FA/C/B12/Ca/succ (IRON-150 PO) Take  by mouth.  thiamine HCL (B-1) 100 mg tablet Take 1 Tablet by mouth daily. 30 Tablet 0    acetaminophen (Tylenol Extra Strength) 500 mg tablet Take  by mouth every six (6) hours as needed for Pain.  albuterol (PROVENTIL HFA, VENTOLIN HFA, PROAIR HFA) 90 mcg/actuation inhaler Take 2 Puffs by inhalation every six (6) hours as needed for Wheezing. 1 Inhaler 3    enoxaparin (LOVENOX) 40 mg/0.4 mL 0.4 mL by SubCUTAneous route every twenty-four (24) hours. (Patient not taking: Reported on 3/10/2022) 7 Each 0    ondansetron (ZOFRAN ODT) 4 mg disintegrating tablet Take 1 Tablet by mouth every eight (8) hours as needed for Nausea or Vomiting. (Patient not taking: Reported on 3/10/2022) 12 Tablet 0    diphenhydrAMINE (Benadryl Allergy) 25 mg tablet Take 25 mg by mouth every six (6) hours as needed.  ibuprofen (MOTRIN) 800 mg tablet Take 1 Tab by mouth every eight (8) hours as needed for Pain. (Patient not taking: Reported on 6/29/2021) 60 Tab 3     No current facility-administered medications on file prior to visit.        Past Medical History:   Diagnosis Date    Abscess and cellulitis     recurrent since age 17    Abscess and cellulitis     Asthma     since she was a child    Headache, common migraine     Hypertension        Past Surgical History:   Procedure Laterality Date    HX GASTRIC BYPASS  12/06/2021    HX TUBAL LIGATION  2019    NE ABDOMEN SURGERY PROC UNLISTED         Social History     Tobacco Use    Smoking status: Never Smoker    Smokeless tobacco: Never Used   Substance Use Topics    Alcohol use: Not Currently     Alcohol/week: 0.0 standard drinks    Drug use: Not Currently     Types: Marijuana       Family History   Problem Relation Age of Onset    Bleeding Prob Mother     Cancer Maternal Grandmother     Stroke Maternal Grandmother ROS:  General: Negative for fevers, chills, night sweats, fatigue, weight loss, or weight gain. GI: Negative for abdominal pain, change in bowel habits, hematochezia, melena, or GERD. Integumentary: Negative for dermatitis or abnormal moles. HEENT: Negative for visual changes, vertigo, epistaxis, dysphagia, or hoarseness    Cardiac: Negative for chest pain, palpitations, hypertension, edema, or varicosities    Resp: Negative for cough, shortness of breath, wheezing, hemoptysis, snoring, or reactive airway disease    : Negative for hematuria, dysuria, frequency, urgency, nocturia, or stress urinary incontinence    MSK:  Negative for weakness, joint pain, or arthritis    Endocrine: Negative for diabetes, thyroid disease, polyuria, polydipsia, polyphagia, poor wound, heat intolerance, or cold intolerance. Lymph/Heme: Negative for anemia, bruising, or history of blood transfusions. Neuro:  Negative for dizziness, headache, fainting, seizures, and stroke. Psychiatry:  Negative for anxiety or depression    Physical Exam    Visit Vitals  BP (!) 143/95 (BP 1 Location: Left arm)   Pulse 84   Temp 97.1 °F (36.2 °C)   Resp 16   Ht 5' 8\" (1.727 m)   Wt (!) 196 kg (432 lb)   BMI 65.69 kg/m²       Nursing note reviewed. General: 31 y.o. female is in no acute distress. Head: Normocephalic, atraumatic  Resp: Clear to auscultation bilaterally, no wheezing, rhonchi, or rales, excursions normal and symmetrical.  Cardio: Regular rate and rhythm, no murmurs, clicks, gallops, or rubs. No edema or varicosities. Abdomen: Obese, soft, nontender, nondistended, normoactive bowel sounds, no hernias, no hepatosplenomegaly,   Psych: Alert and oriented to person, place, and time.     Impression    Status post laparoscopic gastric bypass December 6, 2021 with less than expected weight loss but with expected comorbidity resolution      Plan    Formal bariatric nutrition evaluation for assistance with advancement of diet  Continue compliance with the bariatric surgical diet, exercise regimen, vitamin supplementation protocol, encourage monthly attendance to bariatric support group meeting  Follow-up May 2022 with 6-month labs for ongoing bariatric surgical evaluation

## 2022-03-19 PROBLEM — E66.01 MORBID OBESITY WITH BMI OF 70 AND OVER, ADULT (HCC): Status: ACTIVE | Noted: 2021-12-06

## 2022-05-25 PROBLEM — U07.1 COVID-19: Status: ACTIVE | Noted: 2021-05-15

## 2022-05-27 ENCOUNTER — HOSPITAL ENCOUNTER (OUTPATIENT)
Dept: LAB | Age: 37
Discharge: HOME | End: 2022-05-27

## 2022-05-27 LAB — XX-LABCORP SPECIMEN COL,LCBCF: NORMAL

## 2022-05-27 PROCEDURE — 99001 SPECIMEN HANDLING PT-LAB: CPT

## 2022-05-28 LAB
25(OH)D3+25(OH)D2 SERPL-MCNC: 28.7 NG/ML (ref 30–100)
ALBUMIN SERPL-MCNC: 3.8 G/DL (ref 3.8–4.8)
ALBUMIN/GLOB SERPL: 1.1 {RATIO} (ref 1.2–2.2)
ALP SERPL-CCNC: 111 IU/L (ref 44–121)
ALT SERPL-CCNC: 13 IU/L (ref 0–32)
AST SERPL-CCNC: 12 IU/L (ref 0–40)
BILIRUB SERPL-MCNC: <0.2 MG/DL (ref 0–1.2)
BUN SERPL-MCNC: 5 MG/DL (ref 6–20)
BUN/CREAT SERPL: 5 (ref 9–23)
CALCIUM SERPL-MCNC: 9.4 MG/DL (ref 8.7–10.2)
CHLORIDE SERPL-SCNC: 105 MMOL/L (ref 96–106)
CO2 SERPL-SCNC: 24 MMOL/L (ref 20–29)
CREAT SERPL-MCNC: 0.93 MG/DL (ref 0.57–1)
EGFR: 83 ML/MIN/1.73
ERYTHROCYTE [DISTWIDTH] IN BLOOD BY AUTOMATED COUNT: 13.9 % (ref 11.7–15.4)
FOLATE SERPL-MCNC: 9.7 NG/ML
GLOBULIN SER CALC-MCNC: 3.4 G/DL (ref 1.5–4.5)
GLUCOSE SERPL-MCNC: 68 MG/DL (ref 65–99)
HCT VFR BLD AUTO: 36.1 % (ref 34–46.6)
HGB BLD-MCNC: 10.9 G/DL (ref 11.1–15.9)
IRON SERPL-MCNC: 26 UG/DL (ref 27–159)
MCH RBC QN AUTO: 25.2 PG (ref 26.6–33)
MCHC RBC AUTO-ENTMCNC: 30.2 G/DL (ref 31.5–35.7)
MCV RBC AUTO: 83 FL (ref 79–97)
PLATELET # BLD AUTO: 555 X10E3/UL (ref 150–450)
POTASSIUM SERPL-SCNC: 3.7 MMOL/L (ref 3.5–5.2)
PROT SERPL-MCNC: 7.2 G/DL (ref 6–8.5)
RBC # BLD AUTO: 4.33 X10E6/UL (ref 3.77–5.28)
SODIUM SERPL-SCNC: 143 MMOL/L (ref 134–144)
VIT B12 SERPL-MCNC: 1777 PG/ML (ref 232–1245)
WBC # BLD AUTO: 13.5 X10E3/UL (ref 3.4–10.8)

## 2022-06-01 ENCOUNTER — TELEPHONE (OUTPATIENT)
Dept: SURGERY | Age: 37
End: 2022-06-01

## 2022-06-06 LAB — VIT B1 BLD-SCNC: 132.4 NMOL/L (ref 66.5–200)

## 2022-06-09 ENCOUNTER — OFFICE VISIT (OUTPATIENT)
Dept: SURGERY | Age: 37
End: 2022-06-09
Payer: MEDICAID

## 2022-06-09 VITALS
BODY MASS INDEX: 44.41 KG/M2 | RESPIRATION RATE: 16 BRPM | HEART RATE: 87 BPM | WEIGHT: 293 LBS | SYSTOLIC BLOOD PRESSURE: 129 MMHG | HEIGHT: 68 IN | TEMPERATURE: 98 F | DIASTOLIC BLOOD PRESSURE: 77 MMHG | OXYGEN SATURATION: 98 %

## 2022-06-09 DIAGNOSIS — K91.2 POSTOPERATIVE INTESTINAL MALABSORPTION: Primary | ICD-10-CM

## 2022-06-09 PROCEDURE — 99214 OFFICE O/P EST MOD 30 MIN: CPT | Performed by: SURGERY

## 2022-06-09 NOTE — PROGRESS NOTES
Bariatric Follow-Up Evaluation    Nellie Koch is a 31 y.o. black female who presents in follow-up status post laparoscopic gastric bypass December 6, 2021 without specific complaint  Compliant with the bariatric surgical diet noting appropriate early satiety with no specific food intolerances or pathologic emesis. The patient has not experienced dumping syndrome and she is avoided high density carbohydrates  Compliant with multivitamin calcium citrate, vit B1 vit B12 and vitamin D supplementation  Activity: Walking 30 minutes on daily basis  Comorbidities: Prediabetes, reactive airway disease, clinical obstructive sleep apnea-resolved                           weight related arthropathy-improved  Preoperative weight: 509  Current weight: 392. BMI: 60  Estimated weight loss: 293  Current weight loss: 117  Goal weight: 216  Percentage weight loss: 40% / 6 months    Weight Loss Metrics 6/9/2022 6/9/2022 3/10/2022 3/10/2022 12/6/2021 12/2/2021 10/28/2021   Pre op / Initial Wt 509 - 509 - - - 509   Today's Wt - 392 lb - 432 lb - 510 lb -   BMI - 59.6 kg/m2 - 65.69 kg/m2 77.55 kg/m2 - -   Ideal Body Wt 143 - 143 - - - 142   Excess Body Wt 366 - 366 - - - 367   Goal Wt 216 - 216 - - - 216   Wt loss to date 117 - 77 - - - 0   % Wt Loss 0.4 - 0.26 - - - 0   80% .8 - 292.8 - - - 293.6       Allergies   Allergen Reactions    Bactrim [Sulfamethoprim Ds] Other (comments)     Blisters-Abdirahman Gino's    Iodine Hives and Swelling     Topical only    Sulfamethoxazole-Trimethoprim Unknown (comments)     Pt states inside of her mouth feels raw when she takes Bactrim. Current Outpatient Medications on File Prior to Visit   Medication Sig Dispense Refill    multivitamin with iron (FLINTSTONES) chewable tablet Take 1 Tablet by mouth daily.  cyanocobalamin (Vitamin B-12) 100 mcg tablet Take 100 mcg by mouth daily.  calcium citrate 200 mg (950 mg) tablet Take 200 mg by mouth daily.       iron bis-gly/FA/C/B12/Ca/succ (IRON-150 PO) Take  by mouth.  thiamine HCL (B-1) 100 mg tablet Take 1 Tablet by mouth daily. 30 Tablet 0    acetaminophen (Tylenol Extra Strength) 500 mg tablet Take  by mouth every six (6) hours as needed for Pain.  albuterol (PROVENTIL HFA, VENTOLIN HFA, PROAIR HFA) 90 mcg/actuation inhaler Take 2 Puffs by inhalation every six (6) hours as needed for Wheezing. 1 Inhaler 3    enoxaparin (LOVENOX) 40 mg/0.4 mL 0.4 mL by SubCUTAneous route every twenty-four (24) hours. (Patient not taking: Reported on 3/10/2022) 7 Each 0    ondansetron (ZOFRAN ODT) 4 mg disintegrating tablet Take 1 Tablet by mouth every eight (8) hours as needed for Nausea or Vomiting. (Patient not taking: Reported on 3/10/2022) 12 Tablet 0    diphenhydrAMINE (Benadryl Allergy) 25 mg tablet Take 25 mg by mouth every six (6) hours as needed.  ibuprofen (MOTRIN) 800 mg tablet Take 1 Tab by mouth every eight (8) hours as needed for Pain. (Patient not taking: Reported on 6/29/2021) 60 Tab 3     No current facility-administered medications on file prior to visit.        Past Medical History:   Diagnosis Date    Abscess and cellulitis     recurrent since age 17    Abscess and cellulitis     Asthma     since she was a child    Headache, common migraine     Hypertension        Past Surgical History:   Procedure Laterality Date    HX GASTRIC BYPASS  12/06/2021    HX TUBAL LIGATION  2019    NC ABDOMEN SURGERY PROC UNLISTED         Social History     Tobacco Use    Smoking status: Never Smoker    Smokeless tobacco: Never Used   Vaping Use    Vaping Use: Never used   Substance Use Topics    Alcohol use: Not Currently     Alcohol/week: 0.0 standard drinks    Drug use: Not Currently     Types: Marijuana       Family History   Problem Relation Age of Onset    Bleeding Prob Mother     Cancer Maternal Grandmother     Stroke Maternal Grandmother        ROS:  General: Negative for fevers, chills, night sweats, fatigue, weight loss, or weight gain. GI: Negative for abdominal pain, change in bowel habits, hematochezia, melena, or GERD. Integumentary: Negative for dermatitis or abnormal moles. HEENT: Negative for visual changes, vertigo, epistaxis, dysphagia, or hoarseness    Cardiac: Negative for chest pain, palpitations, hypertension, edema, or varicosities    Resp: Negative for cough, shortness of breath, wheezing, hemoptysis, snoring, or reactive airway disease    : Negative for hematuria, dysuria, frequency, urgency, nocturia, or stress urinary incontinence    MSK:  Negative for weakness, joint pain, or arthritis    Endocrine: Negative for diabetes, thyroid disease, polyuria, polydipsia, polyphagia, poor wound, heat intolerance, or cold intolerance. Lymph/Heme: Negative for anemia, bruising, or history of blood transfusions. Neuro:  Negative for dizziness, headache, fainting, seizures, and stroke. Psychiatry:  Negative for anxiety or depression    Physical Exam    Visit Vitals  /77   Pulse 87   Temp 98 °F (36.7 °C) (Temporal)   Resp 16   Ht 5' 8\" (1.727 m)   Wt (!) 177.8 kg (392 lb)   LMP 05/15/2022 (Approximate)   SpO2 98%   BMI 59.60 kg/m²       Nursing note reviewed. General: 31 y.o. female is in no acute distress. Head: Normocephalic, atraumatic  Resp: Clear to auscultation bilaterally, no wheezing, rhonchi, or rales, excursions normal and symmetrical.  Cardio: Regular rate and rhythm, no murmurs, clicks, gallops, or rubs. No edema or varicosities. Abdomen: Obese, soft, nontender, nondistended, normoactive bowel sounds, no hernias,   Psych: Alert and oriented to person, place, and time.     May 27, 2022 CBC, CMP, B1, vit B12, folate: Hemoglobin 10.9, white blood cell count 13.5, B12 1779, iron 26, vitamin D28.7 With remainder laboratory values within normal limits    Impression    Status post laparoscopic gastric bypass December 6, 2021 with pleasant expected weight loss but with appropriate comorbidity resolution      Plan    Double iron/vitamin C and vitamin D supplementation  Formal bariatric nutrition evaluation September 2022  Continue compliance with the gastric bypass diet, exercise regimen, vitamin supplementation protocol with the changes as noted, encourage monthly attendance to bariatric support group meetings  Bariatric surgical follow-up December 2022 with annual labs for ongoing bariatric surgical evaluation

## 2022-06-14 ENCOUNTER — TELEPHONE (OUTPATIENT)
Dept: SURGERY | Age: 37
End: 2022-06-14

## 2022-06-15 ENCOUNTER — TELEPHONE (OUTPATIENT)
Dept: SURGERY | Age: 37
End: 2022-06-15

## 2022-06-17 ENCOUNTER — TELEPHONE (OUTPATIENT)
Dept: SURGERY | Age: 37
End: 2022-06-17

## 2022-06-28 NOTE — PROGRESS NOTES
Yessy Roman is a 31 y.o. female  Chief Complaint   Patient presents with    Follow-up     Bariatric 6 month follow up     Pt ID confirmed    Weight Loss Metrics 6/9/2022 6/9/2022 3/10/2022 3/10/2022 12/6/2021 12/2/2021 10/28/2021   Pre op / Initial Wt 509 - 509 - - - 509   Today's Wt - 392 lb - 432 lb - 510 lb -   BMI - 59.6 kg/m2 - 65.69 kg/m2 77.55 kg/m2 - -   Ideal Body Wt 143 - 143 - - - 142   Excess Body Wt 366 - 366 - - - 367   Goal Wt 216 - 216 - - - 216   Wt loss to date 117 - 77 - - - 0   % Wt Loss 0.4 - 0.26 - - - 0   80% .8 - 292.8 - - - 293.6       Body mass index is 59.6 kg/m². none

## 2022-08-24 DIAGNOSIS — J45.20 MILD INTERMITTENT ASTHMA WITHOUT COMPLICATION: ICD-10-CM

## 2022-08-24 RX ORDER — ALBUTEROL SULFATE 90 UG/1
2 AEROSOL, METERED RESPIRATORY (INHALATION)
Qty: 1 EACH | Refills: 3 | Status: SHIPPED | OUTPATIENT
Start: 2022-08-24

## 2022-09-09 DIAGNOSIS — K91.2 POSTOPERATIVE INTESTINAL MALABSORPTION: ICD-10-CM

## 2022-12-08 ENCOUNTER — OFFICE VISIT (OUTPATIENT)
Dept: SURGERY | Age: 37
End: 2022-12-08
Payer: MEDICAID

## 2022-12-08 VITALS
SYSTOLIC BLOOD PRESSURE: 148 MMHG | HEIGHT: 68 IN | DIASTOLIC BLOOD PRESSURE: 86 MMHG | OXYGEN SATURATION: 99 % | WEIGHT: 293 LBS | BODY MASS INDEX: 44.41 KG/M2 | TEMPERATURE: 98 F | HEART RATE: 74 BPM

## 2022-12-08 DIAGNOSIS — E66.01 MORBID OBESITY (HCC): ICD-10-CM

## 2022-12-08 DIAGNOSIS — K91.2 POST-RESECTION MALABSORPTION: Primary | ICD-10-CM

## 2022-12-08 DIAGNOSIS — Z98.84 S/P GASTRIC BYPASS: ICD-10-CM

## 2022-12-08 DIAGNOSIS — Z71.3 ENCOUNTER FOR WEIGHT LOSS COUNSELING: ICD-10-CM

## 2022-12-08 DIAGNOSIS — L98.7 EXCESSIVE AND REDUNDANT SKIN AND SUBCUTANEOUS TISSUE: ICD-10-CM

## 2022-12-08 RX ORDER — NYSTATIN 100000 [USP'U]/G
POWDER TOPICAL 4 TIMES DAILY
Qty: 30 G | Refills: 2 | Status: SHIPPED | OUTPATIENT
Start: 2022-12-08

## 2022-12-08 RX ORDER — PHENTERMINE HYDROCHLORIDE 37.5 MG/1
37.5 TABLET ORAL
Qty: 30 TABLET | Refills: 0 | Status: SHIPPED | OUTPATIENT
Start: 2022-12-08

## 2022-12-08 NOTE — PROGRESS NOTES
Bariatric Postoperative Progress Note    CC: 12 Month LGBP Follow Up    Jean Claude Andrews is a 30 y.o. female now 1 year status post laparoscopic gastric bypass surgery performed on 12/6/21. Doing well overall. Currently eating beef, pork, chicken, spinach, broccoli, occ chips, slim jet. Taking in 124 oz water,  unknown g protein. 30-60 min of activity 1-2 days a week. Bowel movements are regular. She is compliant with multivitamins, calcium, Vit D and B12 supplements. Diet Recall:  B: 1 egg with 2 slices liu  L: wrap with lettuce or low carb tortilla  S: cheese and slim jet or nuts  D: Meat and vegetables  Will occasionally snack on chips/crackers 1-2 x weekly, denies sugary drinks. Currently working 2 jobs and not able to exercise often. Going to gym 1-2 x weekly and mainly doing cardio. Reporting boils under breasts from excess skin. Also experiencing itching, rashes, and skin irritation under breasts and pannus from excess skin chafing. ETOH (for birthday), smoking cannabis daily to assist with sleep, denies NSAIDs.      Weight Loss Metrics 12/8/2022 12/8/2022 6/9/2022 6/9/2022 3/10/2022 3/10/2022 12/6/2021   Pre op / Initial Wt 509 - 509 - 509 - -   Today's Wt - 342 lb - 392 lb - 432 lb -   BMI - 52 kg/m2 - 59.6 kg/m2 - 65.69 kg/m2 77.55 kg/m2   Ideal Body Wt 143 - 143 - 143 - -   Excess Body Wt 366 - 366 - 366 - -   Goal Wt 216 - 216 - 216 - -   Wt loss to date 167 - 117 - 77 - -   % Wt Loss 0.57 - 0.4 - 0.26 - -   80% .8 - 292.8 - 292.8 - -       Comorbidities:  Hypertension: not applicable  Diabetes: not applicable  Obstructive Sleep Apnea: not applicable  Hyperlipidemia: not applicable  Stress Urinary Incontinence: not applicable  Gastroesophageal Reflux: not applicable  Weight related arthropathy:improved      Past Medical History:   Diagnosis Date    Abscess and cellulitis     recurrent since age 17    Abscess and cellulitis     Asthma     since she was a child    Headache, common migraine     Hypertension        Past Surgical History:   Procedure Laterality Date    HX GASTRIC BYPASS  12/06/2021    HX TUBAL LIGATION  2019    [place] ABDOMEN SURGERY PROC UNLISTED         Current Outpatient Medications   Medication Sig Dispense Refill    nystatin (MYCOSTATIN) powder Apply  to affected area four (4) times daily. 30 g 2    phentermine (ADIPEX-P) 37.5 mg tablet Take 1 Tablet by mouth every morning. Max Daily Amount: 37.5 mg. 30 Tablet 0    albuterol (PROVENTIL HFA, VENTOLIN HFA, PROAIR HFA) 90 mcg/actuation inhaler Take 2 Puffs by inhalation every six (6) hours as needed for Wheezing. 1 Each 3    multivitamin with iron (FLINTSTONES) chewable tablet Take 1 Tablet by mouth daily. cyanocobalamin (VITAMIN B12) 100 mcg tablet Take 100 mcg by mouth daily. calcium citrate 200 mg (950 mg) tablet Take 200 mg by mouth daily. iron bis-gly/FA/C/B12/Ca/succ (IRON-150 PO) Take  by mouth. thiamine HCL (B-1) 100 mg tablet Take 1 Tablet by mouth daily. 30 Tablet 0    enoxaparin (LOVENOX) 40 mg/0.4 mL 0.4 mL by SubCUTAneous route every twenty-four (24) hours. (Patient not taking: No sig reported) 7 Each 0    acetaminophen (TYLENOL) 500 mg tablet Take  by mouth every six (6) hours as needed for Pain. (Patient not taking: Reported on 12/8/2022)         Allergies   Allergen Reactions    Bactrim [Sulfamethoprim Ds] Other (comments)     Blisters-Abdirahman Gino's    Iodine Hives and Swelling     Topical only    Sulfamethoxazole-Trimethoprim Unknown (comments)     Pt states inside of her mouth feels raw when she takes Bactrim. ROS:  Review of Systems   Constitutional:  Positive for weight loss. Negative for malaise/fatigue. Eyes: Negative. Respiratory: Negative. Cardiovascular:  Negative for chest pain and palpitations. Gastrointestinal:  Negative for abdominal pain, blood in stool, constipation, diarrhea, heartburn, melena, nausea and vomiting.    Skin:  Positive for itching (see HPI) and rash.        Hair loss   Neurological:  Positive for headaches. Negative for dizziness, tingling, loss of consciousness and weakness. Physicial Exam:  Visit Vitals  BP (!) 148/86 (BP 1 Location: Left upper arm, BP Patient Position: Sitting)   Pulse 74   Temp 98 °F (36.7 °C) (Temporal)   Ht 5' 8\" (1.727 m)   Wt 155.1 kg (342 lb)   SpO2 99%   BMI 52.00 kg/m²     Physical Exam  Vitals and nursing note reviewed. Constitutional:       Appearance: She is obese. HENT:      Head: Normocephalic and atraumatic. Cardiovascular:      Rate and Rhythm: Normal rate. Heart sounds: Normal heart sounds. Pulmonary:      Effort: Pulmonary effort is normal.      Breath sounds: Normal breath sounds. Abdominal:      General: Bowel sounds are normal. There is no distension. Palpations: Abdomen is soft. There is no mass. Tenderness: There is no abdominal tenderness. Hernia: No hernia is present. Musculoskeletal:         General: Normal range of motion. Skin:     General: Skin is warm and dry. Neurological:      General: No focal deficit present. Mental Status: She is alert and oriented to person, place, and time. Psychiatric:         Mood and Affect: Mood normal.         Behavior: Behavior normal.       Labs:   No results found for this or any previous visit (from the past 672 hour(s)). Assessment/Plan:   She is currently 1 year s/p laparoscopic gastric bypass surgery with a total weight loss of 167 lbs to date, doing well. Labs were not completed prior to visit and pt was instructed to continue MVI/B1/B12/D supplementation. She will get these completed before next visit. Skin care and use of support garments discussed for skin itching/irritation. Will prescribe Nystatin. Risks of smoke inhalation discussed after bariatric surgery, would recommend edible forms of cannabis instead of inhalation.    Pt desires to start phentermine--reviewed potential SE including: elevated HR, BP, increased anxiety, insomnia, constipation  Pt to start with 37.5mg tablet--1/2 tab early am--add 2nd half after 1st week if needing increased suppression, but no later than noon to avoid insomnia. Gave 30 tabs, no RF--pt understands will need monthly visits    We have reviewed the components of a successful postoperative course including requirement for a high protein, low carbohydrate diet, 64 oz a day of zero calorie liquids, daily vitamin supplementation, daily exercise (150 mis/week), regular follow-up, and participation in support groups. Refer to registered dietitian for more detailed medical nutrition therapy as needed. The primary encounter diagnosis was Post-resection malabsorption. Diagnoses of S/P gastric bypass, Morbid obesity ([address] BMI 50.0-59.9, adult ([address] Excessive and redundant skin and subcutaneous tissue, and Encounter for weight loss counseling were also pertinent to this visit. Follow-up and Dispositions    Return in about 4 weeks (around 1/5/2023). with labs, sooner as needed.   Luly Elena NP

## 2022-12-09 DIAGNOSIS — E66.01 MORBID OBESITY (HCC): ICD-10-CM

## 2022-12-09 DIAGNOSIS — Z98.84 S/P GASTRIC BYPASS: ICD-10-CM

## 2022-12-09 DIAGNOSIS — Z71.3 ENCOUNTER FOR WEIGHT LOSS COUNSELING: ICD-10-CM

## 2022-12-09 DIAGNOSIS — L98.7 EXCESSIVE AND REDUNDANT SKIN AND SUBCUTANEOUS TISSUE: ICD-10-CM

## 2022-12-09 DIAGNOSIS — K91.2 POSTOPERATIVE INTESTINAL MALABSORPTION: ICD-10-CM

## 2022-12-09 DIAGNOSIS — K91.2 POST-RESECTION MALABSORPTION: ICD-10-CM

## 2023-01-10 ENCOUNTER — HOSPITAL ENCOUNTER (OUTPATIENT)
Dept: LAB | Age: 38
Discharge: HOME | End: 2023-01-10

## 2023-01-10 ENCOUNTER — OFFICE VISIT (OUTPATIENT)
Dept: SURGERY | Age: 38
End: 2023-01-10
Payer: MEDICAID

## 2023-01-10 VITALS
SYSTOLIC BLOOD PRESSURE: 139 MMHG | DIASTOLIC BLOOD PRESSURE: 84 MMHG | TEMPERATURE: 98 F | OXYGEN SATURATION: 100 % | HEART RATE: 84 BPM | WEIGHT: 293 LBS | HEIGHT: 68 IN | BODY MASS INDEX: 44.41 KG/M2

## 2023-01-10 DIAGNOSIS — Z71.3 ENCOUNTER FOR WEIGHT LOSS COUNSELING: ICD-10-CM

## 2023-01-10 DIAGNOSIS — E66.01 MORBID OBESITY (HCC): Primary | ICD-10-CM

## 2023-01-10 DIAGNOSIS — Z79.899 FOLLOW-UP ENCOUNTER INVOLVING MEDICATION: ICD-10-CM

## 2023-01-10 DIAGNOSIS — K91.2 POST-RESECTION MALABSORPTION: ICD-10-CM

## 2023-01-10 DIAGNOSIS — L98.7 EXCESSIVE AND REDUNDANT SKIN AND SUBCUTANEOUS TISSUE: ICD-10-CM

## 2023-01-10 DIAGNOSIS — Z98.84 S/P GASTRIC BYPASS: ICD-10-CM

## 2023-01-10 LAB — XX-LABCORP SPECIMEN COL,LCBCF: NORMAL

## 2023-01-10 PROCEDURE — 99212 OFFICE O/P EST SF 10 MIN: CPT | Performed by: NURSE PRACTITIONER

## 2023-01-10 PROCEDURE — 99001 SPECIMEN HANDLING PT-LAB: CPT

## 2023-01-10 RX ORDER — PREDNISONE 20 MG/1
TABLET ORAL
COMMUNITY
Start: 2023-01-06

## 2023-01-10 RX ORDER — PHENTERMINE HYDROCHLORIDE 37.5 MG/1
37.5 TABLET ORAL
Qty: 45 TABLET | Refills: 0 | Status: SHIPPED | OUTPATIENT
Start: 2023-01-10

## 2023-01-10 NOTE — PROGRESS NOTES
Bariatric Postoperative Progress Note    CC: Weight Management/Medication Follow Up    Naida Moe is a 30 y.o. female now 1 year status post laparoscopic gastric bypass surgery performed on 12/6/21.  -15 lbs  Doing well overall. Currently eating chicken, fish, turkey, variety of fruit and vegetables. Taking in 128 oz sugar free fluids,  unknown g protein. 60-90 min of activity 3-4 days a week. Bowel movements are constipated. She is compliant with multivitamins, calcium, Vit D and B12 supplements. Currently taking full tab 37.5 mg phentermine. Denies any SE and desires to continue. Taking 5 day course of PO steroids for asthma flare up. Weight Loss Metrics 1/10/2023 1/10/2023 12/8/2022 12/8/2022 6/9/2022 6/9/2022 3/10/2022   Pre op / Initial Wt 509 - 509 - 509 - 509   Today's Wt - 327 lb - 342 lb - 392 lb -   BMI - 49.72 kg/m2 - 52 kg/m2 - 59.6 kg/m2 -   Ideal Body Wt 143 - 143 - 143 - 143   Excess Body Wt 366 - 366 - 366 - 366   Goal Wt 216 - 216 - 216 - 216   Wt loss to date 182 - 167 - 117 - 77   % Wt Loss 0.62 - 0.57 - 0.4 - 0.26   80% .8 - 292.8 - 292.8 - 292.8         Past Medical History:   Diagnosis Date    Abscess and cellulitis     recurrent since age 17    Abscess and cellulitis     Asthma     since she was a child    Headache, common migraine     Hypertension        Past Surgical History:   Procedure Laterality Date    HX GASTRIC BYPASS  12/06/2021    HX TUBAL LIGATION  2019    [place] UNLISTED PROCEDURE ABDOMEN PERITONEUM & OMENTUM         Current Outpatient Medications   Medication Sig Dispense Refill    predniSONE (DELTASONE) 20 mg tablet take 3 tablets by mouth once daily for 5 days      phentermine (ADIPEX-P) 37.5 mg tablet Take 1 Tablet by mouth every morning. Max Daily Amount: 37.5 mg. 45 Tablet 0    nystatin (MYCOSTATIN) powder Apply  to affected area four (4) times daily.  30 g 2    albuterol (PROVENTIL HFA, VENTOLIN HFA, PROAIR HFA) 90 mcg/actuation inhaler Take 2 Puffs by inhalation every six (6) hours as needed for Wheezing. 1 Each 3    multivitamin with iron (FLINTSTONES) chewable tablet Take 1 Tablet by mouth daily. cyanocobalamin (VITAMIN B12) 100 mcg tablet Take 100 mcg by mouth daily. calcium citrate 200 mg (950 mg) tablet Take 200 mg by mouth daily. iron bis-gly/FA/C/B12/Ca/succ (IRON-150 PO) Take  by mouth. thiamine HCL (B-1) 100 mg tablet Take 1 Tablet by mouth daily. 30 Tablet 0       Allergies   Allergen Reactions    Bactrim [Sulfamethoprim Ds] Other (comments)     Blisters-Abdirahman Gino's    Iodine Hives and Swelling     Topical only    Sulfamethoxazole-Trimethoprim Unknown (comments)     Pt states inside of her mouth feels raw when she takes Bactrim. ROS:  Review of Systems   Constitutional:  Positive for weight loss. Negative for malaise/fatigue. Respiratory: Negative. Cardiovascular:  Negative for chest pain and palpitations. Gastrointestinal:  Positive for constipation. Negative for abdominal pain, diarrhea, heartburn, nausea and vomiting. Skin:  Positive for itching and rash. Boils under breasts from excess skin. Itching, rashes, and skin irritation under breasts and pannus from excess skin chafing. Neurological: Negative. Physicial Exam:  Visit Vitals  /84 (BP 1 Location: Right arm, BP Patient Position: Sitting)   Pulse 84   Temp 98 °F (36.7 °C) (Temporal)   Ht 5' 8\" (1.727 m)   Wt 148.3 kg (327 lb)   SpO2 100%   BMI 49.72 kg/m²     Physical Exam  Vitals and nursing note reviewed. Constitutional:       Appearance: She is obese. HENT:      Head: Normocephalic and atraumatic. Pulmonary:      Effort: Pulmonary effort is normal.   Musculoskeletal:         General: Normal range of motion. Neurological:      General: No focal deficit present. Mental Status: She is alert and oriented to person, place, and time.    Psychiatric:         Mood and Affect: Mood normal.         Behavior: Behavior normal. Labs:   Recent Results (from the past 672 hour(s))   [facility] SPECIMEN COL    Collection Time: 01/10/23  3:08 PM   Result Value Ref Range    [facility] SPEC[facility]LL[facility]. Specimens collected/sent to [facility]. Please direct inquiries to ([phone]). Assessment/Plan:   She is currently 1 year s/p laparoscopic gastric bypass surgery with a total weight loss of 182 lbs to date, doing well. She was unable to get labs done before visit, reports she will get them done after visit today. Will continue phentermine 37.5 mg tabs, refill placed. Continue with high protein/low carb bariatric diet and exercise. Avoidance of PO steroids discussed after bariatric surgery if possible. Denies epigastric pain, nausea, dyspepsia, melena. We have reviewed the components of a successful postoperative course including requirement for a high protein, low carbohydrate diet, 64 oz a day of zero calorie liquids, daily vitamin supplementation, daily exercise (150 mis/week), regular follow-up, and participation in support groups. Refer to registered dietitian for more detailed medical nutrition therapy as needed. The primary encounter diagnosis was Morbid obesity ([facility] [address] Diagnoses of Post-resection malabsorption, S/P gastric bypass, Excessive and redundant skin and subcutaneous tissue, Encounter for weight loss counseling, BMI 45.0-49.9, adult ([facility] [address] and Follow-up encounter involving medication were also pertinent to this visit. Follow-up and Dispositions    Return in about 6 weeks (around 2/21/2023). with labs, sooner as needed.   Vania Arias NP

## 2023-01-18 ENCOUNTER — TELEPHONE (OUTPATIENT)
Dept: SURGERY | Age: 38
End: 2023-01-18

## 2023-01-18 DIAGNOSIS — D50.9 IRON DEFICIENCY ANEMIA, UNSPECIFIED IRON DEFICIENCY ANEMIA TYPE: Primary | ICD-10-CM

## 2023-01-18 DIAGNOSIS — E66.01 MORBID OBESITY (HCC): ICD-10-CM

## 2023-01-18 DIAGNOSIS — K91.2 POST-RESECTION MALABSORPTION: ICD-10-CM

## 2023-01-18 DIAGNOSIS — Z98.84 S/P GASTRIC BYPASS: ICD-10-CM

## 2023-01-18 NOTE — TELEPHONE ENCOUNTER
Called patient to review lab results. Informed patient that her iron level was low. Patient states she takes iron daily with vitamin C. Patient denies taking calcium within 2 hours of taking iron. Per Tiffany Poole NP she will send the patient to hematology for follow up.        ----- Message from Pj Gallardo NP sent at 1/17/2023  2:56 PM EST -----  Iron deficiency anemia, ensure getting in 65 g iron daily, do not take within 2 hours of calcium.

## 2023-05-11 ENCOUNTER — OFFICE VISIT (OUTPATIENT)
Age: 38
End: 2023-05-11
Payer: MEDICARE

## 2023-05-11 VITALS
BODY MASS INDEX: 44.41 KG/M2 | TEMPERATURE: 97 F | SYSTOLIC BLOOD PRESSURE: 131 MMHG | HEART RATE: 73 BPM | WEIGHT: 293 LBS | DIASTOLIC BLOOD PRESSURE: 80 MMHG | HEIGHT: 68 IN

## 2023-05-11 DIAGNOSIS — L98.7 EXCESSIVE AND REDUNDANT SKIN AND SUBCUTANEOUS TISSUE: ICD-10-CM

## 2023-05-11 DIAGNOSIS — Z98.84 S/P GASTRIC BYPASS: ICD-10-CM

## 2023-05-11 DIAGNOSIS — E66.01 MORBID OBESITY (HCC): Primary | ICD-10-CM

## 2023-05-11 DIAGNOSIS — K91.2 POST-RESECTION MALABSORPTION: ICD-10-CM

## 2023-05-11 DIAGNOSIS — Z71.3 ENCOUNTER FOR WEIGHT LOSS COUNSELING: ICD-10-CM

## 2023-05-11 PROCEDURE — 99213 OFFICE O/P EST LOW 20 MIN: CPT | Performed by: NURSE PRACTITIONER

## 2023-05-11 RX ORDER — SEMAGLUTIDE 0.25 MG/.5ML
0.25 INJECTION, SOLUTION SUBCUTANEOUS
Qty: 2 ML | Refills: 0 | Status: SHIPPED | OUTPATIENT
Start: 2023-05-11

## 2023-05-11 RX ORDER — SEMAGLUTIDE 0.5 MG/.5ML
0.5 INJECTION, SOLUTION SUBCUTANEOUS
Qty: 2 ML | Refills: 0 | Status: SHIPPED | OUTPATIENT
Start: 2023-05-11

## 2023-05-11 RX ORDER — NYSTATIN 100000 [USP'U]/G
POWDER TOPICAL
Qty: 30 G | Refills: 2 | Status: SHIPPED | OUTPATIENT
Start: 2023-05-11

## 2023-05-11 NOTE — PROGRESS NOTES
MULTIVITAMINS/IRON PO) Take by mouth      nystatin (MYCOSTATIN) 603420 UNIT/GM powder Apply to affected area four times daily as needed for itching 30 g 2    Semaglutide-Weight Management (WEGOVY) 0.25 MG/0.5ML SOAJ SC injection Inject 0.25 mg into the skin every 7 days Weeks 1-4 2 mL 0    Semaglutide-Weight Management (WEGOVY) 0.5 MG/0.5ML SOAJ SC injection Inject 0.5 mg into the skin every 7 days Weeks 5-8 2 mL 0    albuterol sulfate HFA (PROVENTIL;VENTOLIN;PROAIR) 108 (90 Base) MCG/ACT inhaler Inhale 2 puffs into the lungs every 6 hours as needed      calcium citrate (CALCITRATE) 950 (200 Ca) MG tablet Take 1 tablet by mouth daily      cyanocobalamin 100 MCG tablet Take 1 tablet by mouth daily      thiamine 100 MG tablet Take 1 tablet by mouth daily       No current facility-administered medications for this visit. Allergies   Allergen Reactions    Sulfa Antibiotics Other (See Comments)     Blisters-Enzo Gurpreet's    Iodine Hives and Swelling     Topical only    Sulfamethoxazole-Trimethoprim      Other reaction(s): Unknown (comments)  Pt states inside of her mouth feels raw when she takes Bactrim. ROS:  Review of Systems   Constitutional:  Negative for chills and fever. Respiratory:  Negative for cough and shortness of breath. Cardiovascular:  Negative for chest pain and palpitations. Gastrointestinal:  Negative for abdominal pain, constipation, diarrhea, nausea and vomiting. Skin:         Boils under breasts from excess skin. Itching, rashes, and skin irritation under breasts and pannus from excess skin chafing. Neurological: Negative. Physical Exam:  Vitals:    05/11/23 1120   BP: 131/80   Site: Right Upper Arm   Position: Sitting   Pulse: 73   Temp: 97 °F (36.1 °C)   TempSrc: Temporal   Weight: (!) 319 lb (144.7 kg)   Height: 5' 8\" (1.727 m)      Body mass index is 48.5 kg/m². Physical Exam  Vitals and nursing note reviewed.    Constitutional:       Appearance: She is

## 2023-05-11 NOTE — PATIENT INSTRUCTIONS
Dr. Vida Kirk, [facility]. Phone: [address] [facility], [address]  [place] [address]    Dr. Brennen Slade in Plastic Surgery  Phone [phone] [address] [facility].    [place], [address]    [address]  [place], [place] [address]    Dr. Guyann Skiff  [address] [facility] [address]  [place], [address]  [phone]    Dr. Danny Champagne  [address] [place]  [phone]    Dr. Kaia Vasquez  [facility], [address].   [address] [facility] [address], [address]  [address] [facility]  Multiple locations in [place], [place]  [phone]

## 2023-05-14 ASSESSMENT — ENCOUNTER SYMPTOMS
ABDOMINAL PAIN: 0
NAUSEA: 0
COUGH: 0
SHORTNESS OF BREATH: 0
CONSTIPATION: 0
VOMITING: 0
DIARRHEA: 0

## 2024-11-05 ENCOUNTER — CLINICAL DOCUMENTATION (OUTPATIENT)
Facility: HOSPITAL | Age: 39
End: 2024-11-05

## (undated) DEVICE — BLANKET WRM W29.9XL79.1IN UP BODY FORC AIR MISTRAL-AIR

## (undated) DEVICE — SET SUCT IRR TIP DISP STRYKEFLOW2

## (undated) DEVICE — STAPLE INT WHT BLU G GRN BLK REINF FOR ENDOPATH ECHELON FLX

## (undated) DEVICE — BAG DRNGE C650ML H SZ 5-38 MAMM TISS EXP CNTOUR PROF NACL

## (undated) DEVICE — SHEAR HARMONIC ACET 5MMX36CM -- ACE PLUS

## (undated) DEVICE — SOLUTION IRRIG 1000ML H2O STRL BLT

## (undated) DEVICE — STAPLER SKIN LN REINF 60 MM ECHELON ENDOPATH

## (undated) DEVICE — SUTURE VCRL SZ 3-0 L27IN ABSRB VLT L26MM SH 1/2 CIR J316H

## (undated) DEVICE — TROCAR ENDOSCP SHFT L100MM DIA12MM INTEGR STBL ENDOPATH

## (undated) DEVICE — TAPE ADH W3INXL10YD PLAS TRNSPAR H2O RESIST HYPOALRG CURAD

## (undated) DEVICE — TROCAR LAP L100MM DIA5MM BLDELSS W/ STBL SL ENDOPATH XCEL

## (undated) DEVICE — RELOAD STPL L60MM H1.5-3.6MM REG TISS BLU GRIPPING SURF B

## (undated) DEVICE — BLANKET WRM AD W50XL85.8IN PACU FULL BODY FORC AIR

## (undated) DEVICE — INTENDED FOR TISSUE SEPARATION, AND OTHER PROCEDURES THAT REQUIRE A SHARP SURGICAL BLADE TO PUNCTURE OR CUT.: Brand: BARD-PARKER SAFETY BLADES SIZE 11, STERILE

## (undated) DEVICE — SOLUTION IV 1000ML 0.9% SOD CHL

## (undated) DEVICE — PACK PROCEDURE SURG LAPAROSCOPY 17X7 MM BRTRC PRIMUS

## (undated) DEVICE — 3M™ STERI-STRIP™ COMPOUND BENZOIN TINCTURE 40 BAGS/CARTON 4 CARTONS/CASE C1544: Brand: 3M™ STERI-STRIP™

## (undated) DEVICE — GARMENT,MEDLINE,DVT,INT,CALF,LG, GEN2: Brand: MEDLINE

## (undated) DEVICE — RELOAD STPL L60MM H1-2.6MM MESENTERY THN TISS WHT 6 ROW

## (undated) DEVICE — 4-PORT MANIFOLD: Brand: NEPTUNE 2

## (undated) DEVICE — SUTURE VCRL SZ 2-0 L54IN ABSRB VLT W/O NDL POLYGLACTIN 910 J618H

## (undated) DEVICE — SUTURE MCRYL SZ 4-0 L27IN ABSRB UD L24MM PS-1 3/8 CIR PRIM Y935H

## (undated) DEVICE — TROCAR ENDOSCP BLDELSS 12X100 MM W/ HNDL STBL SL OPT TIP

## (undated) DEVICE — STAPLER SKIN L440MM 32MM LNG 12 FIRING B FRM PWR + GRIPPING

## (undated) DEVICE — STRIP,CLOSURE,WOUND,MEDI-STRIP,1/2X4: Brand: MEDLINE

## (undated) DEVICE — TROCAR ENDOSCP L100MM DIA12MM STBL SL BLDELSS ENDOPATH XCEL

## (undated) DEVICE — VISUALIZATION SYSTEM: Brand: CLEARIFY

## (undated) DEVICE — HANDLE PRB DIA5MM HND CTRL PSTL GRP ENDOPATH PRB + II

## (undated) DEVICE — SUT SLK 2-0SH 30IN BLK --

## (undated) DEVICE — REM POLYHESIVE ADULT PATIENT RETURN ELECTRODE: Brand: VALLEYLAB

## (undated) DEVICE — TRUE CONTENT TO BE POPULATED AS PART OF REBRANDING: Brand: ARGYLE

## (undated) DEVICE — GLOVE SURG SZ 7 L11.33IN FNGR THK9.8MIL STRW LTX POLYMER

## (undated) DEVICE — TROCAR: Brand: KII® OPTICAL ACCESS SYSTEM

## (undated) DEVICE — SCISSORS ENDOSCP DIA5MM CRV MPLR CAUT W/ RATCH HNDL